# Patient Record
Sex: FEMALE | Race: OTHER | HISPANIC OR LATINO | URBAN - METROPOLITAN AREA
[De-identification: names, ages, dates, MRNs, and addresses within clinical notes are randomized per-mention and may not be internally consistent; named-entity substitution may affect disease eponyms.]

---

## 2018-03-22 ENCOUNTER — INPATIENT (INPATIENT)
Facility: HOSPITAL | Age: 68
LOS: 0 days | Discharge: ANOTHER HOSPITAL | DRG: 313 | End: 2018-03-23
Attending: INTERNAL MEDICINE | Admitting: INTERNAL MEDICINE
Payer: COMMERCIAL

## 2018-03-22 VITALS — HEIGHT: 63.78 IN | WEIGHT: 151.02 LBS

## 2018-03-22 DIAGNOSIS — K21.9 GASTRO-ESOPHAGEAL REFLUX DISEASE WITHOUT ESOPHAGITIS: ICD-10-CM

## 2018-03-22 DIAGNOSIS — Z98.890 OTHER SPECIFIED POSTPROCEDURAL STATES: Chronic | ICD-10-CM

## 2018-03-22 DIAGNOSIS — R07.9 CHEST PAIN, UNSPECIFIED: ICD-10-CM

## 2018-03-22 DIAGNOSIS — Z90.710 ACQUIRED ABSENCE OF BOTH CERVIX AND UTERUS: Chronic | ICD-10-CM

## 2018-03-22 DIAGNOSIS — Z29.9 ENCOUNTER FOR PROPHYLACTIC MEASURES, UNSPECIFIED: ICD-10-CM

## 2018-03-22 DIAGNOSIS — S05.90XA UNSPECIFIED INJURY OF UNSPECIFIED EYE AND ORBIT, INITIAL ENCOUNTER: Chronic | ICD-10-CM

## 2018-03-22 DIAGNOSIS — E78.5 HYPERLIPIDEMIA, UNSPECIFIED: ICD-10-CM

## 2018-03-22 LAB
ALBUMIN SERPL ELPH-MCNC: 3.9 G/DL — SIGNIFICANT CHANGE UP (ref 3.5–5)
ALP SERPL-CCNC: 97 U/L — SIGNIFICANT CHANGE UP (ref 40–120)
ALT FLD-CCNC: 23 U/L DA — SIGNIFICANT CHANGE UP (ref 10–60)
ANION GAP SERPL CALC-SCNC: 9 MMOL/L — SIGNIFICANT CHANGE UP (ref 5–17)
AST SERPL-CCNC: 20 U/L — SIGNIFICANT CHANGE UP (ref 10–40)
BASOPHILS # BLD AUTO: 0.1 K/UL — SIGNIFICANT CHANGE UP (ref 0–0.2)
BASOPHILS NFR BLD AUTO: 1.5 % — SIGNIFICANT CHANGE UP (ref 0–2)
BILIRUB SERPL-MCNC: 0.3 MG/DL — SIGNIFICANT CHANGE UP (ref 0.2–1.2)
BUN SERPL-MCNC: 22 MG/DL — HIGH (ref 7–18)
CALCIUM SERPL-MCNC: 9.1 MG/DL — SIGNIFICANT CHANGE UP (ref 8.4–10.5)
CHLORIDE SERPL-SCNC: 106 MMOL/L — SIGNIFICANT CHANGE UP (ref 96–108)
CHOLEST SERPL-MCNC: 255 MG/DL — HIGH (ref 10–199)
CK MB BLD-MCNC: <1.1 % — SIGNIFICANT CHANGE UP (ref 0–3.5)
CK MB CFR SERPL CALC: <1 NG/ML — SIGNIFICANT CHANGE UP (ref 0–3.6)
CK SERPL-CCNC: 91 U/L — SIGNIFICANT CHANGE UP (ref 21–215)
CO2 SERPL-SCNC: 26 MMOL/L — SIGNIFICANT CHANGE UP (ref 22–31)
CREAT SERPL-MCNC: 0.65 MG/DL — SIGNIFICANT CHANGE UP (ref 0.5–1.3)
EOSINOPHIL # BLD AUTO: 0.1 K/UL — SIGNIFICANT CHANGE UP (ref 0–0.5)
EOSINOPHIL NFR BLD AUTO: 2.4 % — SIGNIFICANT CHANGE UP (ref 0–6)
GLUCOSE SERPL-MCNC: 90 MG/DL — SIGNIFICANT CHANGE UP (ref 70–99)
HCT VFR BLD CALC: 40.2 % — SIGNIFICANT CHANGE UP (ref 34.5–45)
HDLC SERPL-MCNC: 59 MG/DL — SIGNIFICANT CHANGE UP (ref 40–125)
HGB BLD-MCNC: 13.2 G/DL — SIGNIFICANT CHANGE UP (ref 11.5–15.5)
LIPID PNL WITH DIRECT LDL SERPL: 167 MG/DL — SIGNIFICANT CHANGE UP
LYMPHOCYTES # BLD AUTO: 0.9 K/UL — LOW (ref 1–3.3)
LYMPHOCYTES # BLD AUTO: 22 % — SIGNIFICANT CHANGE UP (ref 13–44)
MCHC RBC-ENTMCNC: 29.6 PG — SIGNIFICANT CHANGE UP (ref 27–34)
MCHC RBC-ENTMCNC: 32.7 GM/DL — SIGNIFICANT CHANGE UP (ref 32–36)
MCV RBC AUTO: 90.6 FL — SIGNIFICANT CHANGE UP (ref 80–100)
MONOCYTES # BLD AUTO: 0.4 K/UL — SIGNIFICANT CHANGE UP (ref 0–0.9)
MONOCYTES NFR BLD AUTO: 9.3 % — SIGNIFICANT CHANGE UP (ref 2–14)
NEUTROPHILS # BLD AUTO: 2.8 K/UL — SIGNIFICANT CHANGE UP (ref 1.8–7.4)
NEUTROPHILS NFR BLD AUTO: 64.9 % — SIGNIFICANT CHANGE UP (ref 43–77)
NT-PROBNP SERPL-SCNC: 35 PG/ML — SIGNIFICANT CHANGE UP (ref 0–125)
PLATELET # BLD AUTO: 246 K/UL — SIGNIFICANT CHANGE UP (ref 150–400)
POTASSIUM SERPL-MCNC: 4.1 MMOL/L — SIGNIFICANT CHANGE UP (ref 3.5–5.3)
POTASSIUM SERPL-SCNC: 4.1 MMOL/L — SIGNIFICANT CHANGE UP (ref 3.5–5.3)
PROT SERPL-MCNC: 7.7 G/DL — SIGNIFICANT CHANGE UP (ref 6–8.3)
RAPID RVP RESULT: SIGNIFICANT CHANGE UP
RBC # BLD: 4.44 M/UL — SIGNIFICANT CHANGE UP (ref 3.8–5.2)
RBC # FLD: 14.1 % — SIGNIFICANT CHANGE UP (ref 10.3–14.5)
SODIUM SERPL-SCNC: 141 MMOL/L — SIGNIFICANT CHANGE UP (ref 135–145)
TOTAL CHOLESTEROL/HDL RATIO MEASUREMENT: 4.3 RATIO — SIGNIFICANT CHANGE UP (ref 3.3–7.1)
TRIGL SERPL-MCNC: 147 MG/DL — SIGNIFICANT CHANGE UP (ref 10–149)
TROPONIN I SERPL-MCNC: <0.015 NG/ML — SIGNIFICANT CHANGE UP (ref 0–0.04)
TSH SERPL-MCNC: 1.93 UU/ML — SIGNIFICANT CHANGE UP (ref 0.34–4.82)
WBC # BLD: 4.3 K/UL — SIGNIFICANT CHANGE UP (ref 3.8–10.5)
WBC # FLD AUTO: 4.3 K/UL — SIGNIFICANT CHANGE UP (ref 3.8–10.5)

## 2018-03-22 PROCEDURE — 99285 EMERGENCY DEPT VISIT HI MDM: CPT

## 2018-03-22 PROCEDURE — 71046 X-RAY EXAM CHEST 2 VIEWS: CPT | Mod: 26

## 2018-03-22 RX ORDER — ASPIRIN/CALCIUM CARB/MAGNESIUM 324 MG
81 TABLET ORAL DAILY
Qty: 0 | Refills: 0 | Status: DISCONTINUED | OUTPATIENT
Start: 2018-03-22 | End: 2018-03-23

## 2018-03-22 RX ORDER — ENOXAPARIN SODIUM 100 MG/ML
40 INJECTION SUBCUTANEOUS DAILY
Qty: 0 | Refills: 0 | Status: DISCONTINUED | OUTPATIENT
Start: 2018-03-22 | End: 2018-03-23

## 2018-03-22 RX ORDER — NITROGLYCERIN 6.5 MG
0.4 CAPSULE, EXTENDED RELEASE ORAL
Qty: 0 | Refills: 0 | Status: DISCONTINUED | OUTPATIENT
Start: 2018-03-22 | End: 2018-03-23

## 2018-03-22 RX ORDER — METOPROLOL TARTRATE 50 MG
12.5 TABLET ORAL
Qty: 0 | Refills: 0 | Status: DISCONTINUED | OUTPATIENT
Start: 2018-03-22 | End: 2018-03-23

## 2018-03-22 RX ORDER — ATORVASTATIN CALCIUM 80 MG/1
40 TABLET, FILM COATED ORAL AT BEDTIME
Qty: 0 | Refills: 0 | Status: DISCONTINUED | OUTPATIENT
Start: 2018-03-22 | End: 2018-03-23

## 2018-03-22 RX ORDER — SODIUM CHLORIDE 9 MG/ML
3 INJECTION INTRAMUSCULAR; INTRAVENOUS; SUBCUTANEOUS ONCE
Qty: 0 | Refills: 0 | Status: COMPLETED | OUTPATIENT
Start: 2018-03-22 | End: 2018-03-22

## 2018-03-22 RX ADMIN — ATORVASTATIN CALCIUM 40 MILLIGRAM(S): 80 TABLET, FILM COATED ORAL at 22:58

## 2018-03-22 RX ADMIN — SODIUM CHLORIDE 3 MILLILITER(S): 9 INJECTION INTRAMUSCULAR; INTRAVENOUS; SUBCUTANEOUS at 17:15

## 2018-03-22 NOTE — H&P ADULT - FAMILY HISTORY
Mother  Still living? No  Family history of MI (myocardial infarction), Age at diagnosis: Age Unknown     Father  Still living? Unknown  Family history of leukemia, Age at diagnosis: Age Unknown

## 2018-03-22 NOTE — H&P ADULT - RS GEN PE MLT RESP DETAILS PC
no rhonchi/no rales/no wheezes/good air movement/clear to auscultation bilaterally/chest wall tenderness/breath sounds equal/normal/airway patent/respirations non-labored

## 2018-03-22 NOTE — ED PROVIDER NOTE - OBJECTIVE STATEMENT
67 y/o F pt with PMHx of HLD and no significant PSHx presents to ED c/o intermittent CP with associated SOB x2 weeks. Pt additionally reports nausea and dizziness. Per pt, current sx's are non-reproducible. Pt states having been seen by her PMD and a cardiologist, and was ultimately sent to the ED for cardiac evaluation; pt received a full-dose Aspirin earlier today. Pt notes FHx of CAD, with pt's mother dying at 45 y/o from an MI. Pt also notes recent travel from [place] approximately x1 week ago. Pt denies b/l leg pain, b/l leg swelling, or any other complaints. NKDA.

## 2018-03-22 NOTE — ED ADULT NURSE NOTE - OBJECTIVE STATEMENT
Pt AOx3, ambulatory, c/o chest pain and SOB x 2 weeks. Pt also reports occasional n/v. Sent from cardiologist for abnormal EKG. Pt denies LOC, syncope. Pt traveled to [place] 1 week ago, denies legs pain.

## 2018-03-22 NOTE — H&P ADULT - ASSESSMENT
68 F, from home lives with granddaughter ambulates independently, w/ PMHx of HLD and GERD sent in by Cardiologist Dr Bobby Salinas c/o intermittent CP with associated SOB x2 weeks. 68 F, from home lives with granddaughter ambulates independently, w/ PMHx of HLD and GERD sent in by Cardiologist Dr Bobby Salinas c/o intermittent CP with associated SOB x2 weeks.    Pt admitted for r/o ACS.

## 2018-03-22 NOTE — H&P ADULT - PROBLEM SELECTOR PLAN 1
Cardiology Dr Fine  r/o ACS  EKG NSR VR 62 TWI V3,4,5  trop x1 neg  f/u trop 2,3  start ASA, lipitor, BB  f/u ECHO    f/u UA - r/o UTI, pt complaining of dysuria for few days  f/u RVP - coughing, sneezing, nausea, decreased PO appetite

## 2018-03-22 NOTE — H&P ADULT - PROBLEM SELECTOR PLAN 4
IMPROVE VTE Individual Risk Assessment    RISK                                                          Points  [] Previous VTE                                           3  [] Thrombophilia                                        2  [] Lower limb paralysis                              2   [] Current Cancer                                       2   [X] Immobilization > 24 hrs                        1  [] ICU/CCU stay > 24 hours                       1  [X] Age > 60                                                   1    IMPROVE VTE Score: 2  lovenox for DVT ppx  protonix for GI ppx

## 2018-03-22 NOTE — ED PROVIDER NOTE - MEDICAL DECISION MAKING DETAILS
65 y/o F pt with FHx of CAD and Hx of HLD presents with CP and SOB. Will do cardiac enzymes, CXR, and anticipate admission for cardiac evaluation.

## 2018-03-22 NOTE — H&P ADULT - HISTORY OF PRESENT ILLNESS
67 y/o F pt with PMHx of HLD and no significant PSHx presents to ED c/o intermittent CP with associated SOB x2 weeks. Pt additionally reports nausea and dizziness. Per pt, current sx's are non-reproducible. Pt states having been seen by her PMD and a cardiologist, and was ultimately sent to the ED for cardiac evaluation; pt received a full-dose Aspirin earlier today. Pt notes FHx of CAD, with pt's mother dying at 45 y/o from an MI. Pt also notes recent travel from [place] approximately x1 week ago. Pt denies b/l leg pain, b/l leg swelling, or any other complaints. NKDA. 68 F, from home lives with granddaughter ambulates independently, w/ PMHx of HLD and GERD sent in by Cardiologist Dr Bobby Salinas c/o intermittent CP with associated SOB x2 weeks. Pt endorsed 8/10 intermittent left sided dull pain with radiation to the back that self resolves in less than 7 minutes, comes on about 4x/day, improves while lying flat. Additionally, pt endorsed subjective fever, chills, decreased appetite, nausea, dizziness, coughing w/o sputum production and runny nose for a week. Pt recently travelled to [place] for 10 days and returned 2 days ago. Pt also endorsed dysuria for past few days. No notable sick contacts.     Family hx sig. for mother passed away from MI at 47 y/o.

## 2018-03-23 ENCOUNTER — TRANSCRIPTION ENCOUNTER (OUTPATIENT)
Age: 68
End: 2018-03-23

## 2018-03-23 ENCOUNTER — INPATIENT (INPATIENT)
Facility: HOSPITAL | Age: 68
LOS: 2 days | Discharge: HOME | DRG: 287 | End: 2018-03-26
Attending: INTERNAL MEDICINE | Admitting: INTERNAL MEDICINE
Payer: COMMERCIAL

## 2018-03-23 VITALS
SYSTOLIC BLOOD PRESSURE: 144 MMHG | DIASTOLIC BLOOD PRESSURE: 60 MMHG | RESPIRATION RATE: 18 BRPM | TEMPERATURE: 98 F | HEART RATE: 65 BPM | OXYGEN SATURATION: 100 %

## 2018-03-23 VITALS
DIASTOLIC BLOOD PRESSURE: 83 MMHG | WEIGHT: 151.02 LBS | TEMPERATURE: 98 F | RESPIRATION RATE: 18 BRPM | HEART RATE: 76 BPM | HEIGHT: 63 IN | SYSTOLIC BLOOD PRESSURE: 139 MMHG | OXYGEN SATURATION: 99 %

## 2018-03-23 DIAGNOSIS — Z98.890 OTHER SPECIFIED POSTPROCEDURAL STATES: Chronic | ICD-10-CM

## 2018-03-23 DIAGNOSIS — S05.90XA UNSPECIFIED INJURY OF UNSPECIFIED EYE AND ORBIT, INITIAL ENCOUNTER: Chronic | ICD-10-CM

## 2018-03-23 DIAGNOSIS — I20.0 UNSTABLE ANGINA: ICD-10-CM

## 2018-03-23 DIAGNOSIS — Z90.710 ACQUIRED ABSENCE OF BOTH CERVIX AND UTERUS: Chronic | ICD-10-CM

## 2018-03-23 LAB
24R-OH-CALCIDIOL SERPL-MCNC: 26.5 NG/ML — LOW (ref 30–80)
ANION GAP SERPL CALC-SCNC: 9 MMOL/L — SIGNIFICANT CHANGE UP (ref 5–17)
BUN SERPL-MCNC: 26 MG/DL — HIGH (ref 7–18)
CALCIUM SERPL-MCNC: 9.4 MG/DL — SIGNIFICANT CHANGE UP (ref 8.4–10.5)
CHLORIDE SERPL-SCNC: 105 MMOL/L — SIGNIFICANT CHANGE UP (ref 96–108)
CK MB BLD-MCNC: <1.7 % — SIGNIFICANT CHANGE UP (ref 0–3.5)
CK MB BLD-MCNC: <2 % — SIGNIFICANT CHANGE UP (ref 0–3.5)
CK MB CFR SERPL CALC: <1 NG/ML — SIGNIFICANT CHANGE UP (ref 0–3.6)
CK MB CFR SERPL CALC: <1 NG/ML — SIGNIFICANT CHANGE UP (ref 0–3.6)
CK SERPL-CCNC: 49 U/L — SIGNIFICANT CHANGE UP (ref 21–215)
CK SERPL-CCNC: 59 U/L — SIGNIFICANT CHANGE UP (ref 21–215)
CO2 SERPL-SCNC: 25 MMOL/L — SIGNIFICANT CHANGE UP (ref 22–31)
CREAT SERPL-MCNC: 0.74 MG/DL — SIGNIFICANT CHANGE UP (ref 0.5–1.3)
GLUCOSE SERPL-MCNC: 91 MG/DL — SIGNIFICANT CHANGE UP (ref 70–99)
HBA1C BLD-MCNC: 5.7 % — HIGH (ref 4–5.6)
HCT VFR BLD CALC: 38.5 % — SIGNIFICANT CHANGE UP (ref 34.5–45)
HGB BLD-MCNC: 12.4 G/DL — SIGNIFICANT CHANGE UP (ref 11.5–15.5)
MAGNESIUM SERPL-MCNC: 2.4 MG/DL — SIGNIFICANT CHANGE UP (ref 1.6–2.6)
MCHC RBC-ENTMCNC: 29.6 PG — SIGNIFICANT CHANGE UP (ref 27–34)
MCHC RBC-ENTMCNC: 32.3 GM/DL — SIGNIFICANT CHANGE UP (ref 32–36)
MCV RBC AUTO: 91.9 FL — SIGNIFICANT CHANGE UP (ref 80–100)
PHOSPHATE SERPL-MCNC: 4.4 MG/DL — SIGNIFICANT CHANGE UP (ref 2.5–4.5)
PLATELET # BLD AUTO: 227 K/UL — SIGNIFICANT CHANGE UP (ref 150–400)
POTASSIUM SERPL-MCNC: 4.6 MMOL/L — SIGNIFICANT CHANGE UP (ref 3.5–5.3)
POTASSIUM SERPL-SCNC: 4.6 MMOL/L — SIGNIFICANT CHANGE UP (ref 3.5–5.3)
RBC # BLD: 4.19 M/UL — SIGNIFICANT CHANGE UP (ref 3.8–5.2)
RBC # FLD: 14.2 % — SIGNIFICANT CHANGE UP (ref 10.3–14.5)
SODIUM SERPL-SCNC: 139 MMOL/L — SIGNIFICANT CHANGE UP (ref 135–145)
TROPONIN I SERPL-MCNC: <0.015 NG/ML — SIGNIFICANT CHANGE UP (ref 0–0.04)
TROPONIN I SERPL-MCNC: <0.015 NG/ML — SIGNIFICANT CHANGE UP (ref 0–0.04)
VIT B12 SERPL-MCNC: 607 PG/ML — SIGNIFICANT CHANGE UP (ref 232–1245)
WBC # BLD: 5 K/UL — SIGNIFICANT CHANGE UP (ref 3.8–10.5)
WBC # FLD AUTO: 5 K/UL — SIGNIFICANT CHANGE UP (ref 3.8–10.5)

## 2018-03-23 PROCEDURE — 82553 CREATINE MB FRACTION: CPT

## 2018-03-23 PROCEDURE — 80061 LIPID PANEL: CPT

## 2018-03-23 PROCEDURE — 83735 ASSAY OF MAGNESIUM: CPT

## 2018-03-23 PROCEDURE — 83036 HEMOGLOBIN GLYCOSYLATED A1C: CPT

## 2018-03-23 PROCEDURE — 87633 RESP VIRUS 12-25 TARGETS: CPT

## 2018-03-23 PROCEDURE — 99285 EMERGENCY DEPT VISIT HI MDM: CPT | Mod: 25

## 2018-03-23 PROCEDURE — 84443 ASSAY THYROID STIM HORMONE: CPT

## 2018-03-23 PROCEDURE — 93005 ELECTROCARDIOGRAM TRACING: CPT

## 2018-03-23 PROCEDURE — 85027 COMPLETE CBC AUTOMATED: CPT

## 2018-03-23 PROCEDURE — 82550 ASSAY OF CK (CPK): CPT

## 2018-03-23 PROCEDURE — 93010 ELECTROCARDIOGRAM REPORT: CPT

## 2018-03-23 PROCEDURE — 84100 ASSAY OF PHOSPHORUS: CPT

## 2018-03-23 PROCEDURE — 87486 CHLMYD PNEUM DNA AMP PROBE: CPT

## 2018-03-23 PROCEDURE — 87798 DETECT AGENT NOS DNA AMP: CPT

## 2018-03-23 PROCEDURE — 83880 ASSAY OF NATRIURETIC PEPTIDE: CPT

## 2018-03-23 PROCEDURE — 82306 VITAMIN D 25 HYDROXY: CPT

## 2018-03-23 PROCEDURE — 82607 VITAMIN B-12: CPT

## 2018-03-23 PROCEDURE — 80048 BASIC METABOLIC PNL TOTAL CA: CPT

## 2018-03-23 PROCEDURE — 84484 ASSAY OF TROPONIN QUANT: CPT

## 2018-03-23 PROCEDURE — 80053 COMPREHEN METABOLIC PANEL: CPT

## 2018-03-23 PROCEDURE — 87581 M.PNEUMON DNA AMP PROBE: CPT

## 2018-03-23 PROCEDURE — 71046 X-RAY EXAM CHEST 2 VIEWS: CPT

## 2018-03-23 RX ORDER — PANTOPRAZOLE SODIUM 20 MG/1
40 TABLET, DELAYED RELEASE ORAL
Qty: 0 | Refills: 0 | Status: DISCONTINUED | OUTPATIENT
Start: 2018-03-23 | End: 2018-03-26

## 2018-03-23 RX ORDER — IBUPROFEN 200 MG
1 TABLET ORAL
Qty: 0 | Refills: 0 | COMMUNITY

## 2018-03-23 RX ORDER — PANTOPRAZOLE SODIUM 20 MG/1
1 TABLET, DELAYED RELEASE ORAL
Qty: 0 | Refills: 0 | COMMUNITY
Start: 2018-03-23

## 2018-03-23 RX ORDER — METOPROLOL TARTRATE 50 MG
0.5 TABLET ORAL
Qty: 30 | Refills: 0 | OUTPATIENT
Start: 2018-03-23 | End: 2018-04-21

## 2018-03-23 RX ORDER — OMEPRAZOLE 10 MG/1
1 CAPSULE, DELAYED RELEASE ORAL
Qty: 0 | Refills: 0 | COMMUNITY

## 2018-03-23 RX ORDER — NITROGLYCERIN 6.5 MG
0 CAPSULE, EXTENDED RELEASE ORAL
Qty: 0 | Refills: 0 | COMMUNITY
Start: 2018-03-23

## 2018-03-23 RX ORDER — ASPIRIN/CALCIUM CARB/MAGNESIUM 324 MG
81 TABLET ORAL DAILY
Qty: 0 | Refills: 0 | Status: DISCONTINUED | OUTPATIENT
Start: 2018-03-23 | End: 2018-03-26

## 2018-03-23 RX ORDER — METOPROLOL TARTRATE 50 MG
0 TABLET ORAL
Qty: 0 | Refills: 0 | COMMUNITY
Start: 2018-03-23

## 2018-03-23 RX ORDER — HEPARIN SODIUM 5000 [USP'U]/ML
900 INJECTION INTRAVENOUS; SUBCUTANEOUS
Qty: 25000 | Refills: 0 | Status: DISCONTINUED | OUTPATIENT
Start: 2018-03-23 | End: 2018-03-23

## 2018-03-23 RX ORDER — PANTOPRAZOLE SODIUM 20 MG/1
40 TABLET, DELAYED RELEASE ORAL
Qty: 0 | Refills: 0 | Status: DISCONTINUED | OUTPATIENT
Start: 2018-03-23 | End: 2018-03-23

## 2018-03-23 RX ORDER — ATORVASTATIN CALCIUM 80 MG/1
40 TABLET, FILM COATED ORAL AT BEDTIME
Qty: 0 | Refills: 0 | Status: DISCONTINUED | OUTPATIENT
Start: 2018-03-23 | End: 2018-03-26

## 2018-03-23 RX ORDER — METOPROLOL TARTRATE 50 MG
12.5 TABLET ORAL
Qty: 0 | Refills: 0 | Status: DISCONTINUED | OUTPATIENT
Start: 2018-03-23 | End: 2018-03-24

## 2018-03-23 RX ORDER — METOPROLOL TARTRATE 50 MG
0.5 TABLET ORAL
Qty: 0 | Refills: 0 | COMMUNITY
Start: 2018-03-23

## 2018-03-23 RX ORDER — ATORVASTATIN CALCIUM 80 MG/1
1 TABLET, FILM COATED ORAL
Qty: 0 | Refills: 0 | COMMUNITY
Start: 2018-03-23

## 2018-03-23 RX ORDER — ASPIRIN/CALCIUM CARB/MAGNESIUM 324 MG
81 TABLET ORAL DAILY
Qty: 0 | Refills: 0 | Status: DISCONTINUED | OUTPATIENT
Start: 2018-03-23 | End: 2018-03-23

## 2018-03-23 RX ORDER — SIMVASTATIN 20 MG/1
1 TABLET, FILM COATED ORAL
Qty: 0 | Refills: 0 | COMMUNITY

## 2018-03-23 RX ORDER — ASPIRIN/CALCIUM CARB/MAGNESIUM 324 MG
1 TABLET ORAL
Qty: 0 | Refills: 0 | COMMUNITY
Start: 2018-03-23

## 2018-03-23 RX ORDER — HEPARIN SODIUM 5000 [USP'U]/ML
9 INJECTION INTRAVENOUS; SUBCUTANEOUS
Qty: 0 | Refills: 0 | COMMUNITY
Start: 2018-03-23

## 2018-03-23 RX ADMIN — Medication 12.5 MILLIGRAM(S): at 17:05

## 2018-03-23 RX ADMIN — Medication 81 MILLIGRAM(S): at 17:05

## 2018-03-23 RX ADMIN — Medication 0.4 MILLIGRAM(S): at 01:49

## 2018-03-23 RX ADMIN — HEPARIN SODIUM 900 UNIT(S)/HR: 5000 INJECTION INTRAVENOUS; SUBCUTANEOUS at 08:10

## 2018-03-23 RX ADMIN — ATORVASTATIN CALCIUM 40 MILLIGRAM(S): 80 TABLET, FILM COATED ORAL at 21:24

## 2018-03-23 RX ADMIN — PANTOPRAZOLE SODIUM 40 MILLIGRAM(S): 20 TABLET, DELAYED RELEASE ORAL at 17:05

## 2018-03-23 NOTE — CONSULT NOTE ADULT - ASSESSMENT
68 yr old  Female , from home lives with granddaughter ambulates independently, w/ PMHx of CAD-s/[p PTCA 2017, HLD and GERD sent in by Cardiologist for cp,sob,ekg changes, now NSVT.  1.Heparin drip.  2.Echocardiogram.  3.Cardiac cath thi s AM.  4.CAD-asa,b blocker,statin.  5.PPI.

## 2018-03-23 NOTE — PROGRESS NOTE ADULT - PROBLEM SELECTOR PLAN 5
IMPROVE VTE Individual Risk Assessment  RISK                                                          Points  [X] Immobilization > 24 hrs                        1  [X] Age > 60                                                   1  IMPROVE VTE Score: 2, Lovenox for DVT ppx

## 2018-03-23 NOTE — PROGRESS NOTE ADULT - PROBLEM SELECTOR PLAN 1
EKG NSR w/ TWI V3-5  - Trop x 2 negative; pending T3  - C/w ASA, Lipitor, BB  - Negative RVP; c/o coughing, sneezing, nausea, decreased PO appetite  ***Pending TTE  Cardiology Dr. Fine

## 2018-03-23 NOTE — DISCHARGE NOTE ADULT - MEDICATION SUMMARY - MEDICATIONS TO CHANGE
I will SWITCH the dose or number of times a day I take the medications listed below when I get home from the hospital:  None I will SWITCH the dose or number of times a day I take the medications listed below when I get home from the hospital:    Metoprolol Tartrate 25 mg oral tablet  -- 0.5 tab(s) by mouth 2 times a day  hosp

## 2018-03-23 NOTE — DISCHARGE NOTE ADULT - PATIENT PORTAL LINK FT
You can access the [facility] Patient Portal, offered by [facility], by registering with the following website: [email or web address]

## 2018-03-23 NOTE — DISCHARGE NOTE ADULT - PROVIDER TOKENS
TOKEN:'[id number]',FREE:[LAST:[Brad],FIRST:[Bobby],PHONE:[phone]:[(   )    -],ADDRESS:[[address] [place] [address]]] TOKEN:'[id number]',FREE:[LAST:[Ryrafael],FIRST:[Bobby],PHONE:[phone]:[(   )    -],ADDRESS:[[address] [place] [address]]],FREE:[LAST:[Yee],PHONE:[(   )    -],FAX:[(   )    -]]

## 2018-03-23 NOTE — PROGRESS NOTE ADULT - SUBJECTIVE AND OBJECTIVE BOX
68 F, from home lives with granddaughter ambulates independently, w/ PMHx of HLD and GERD sent in by Cardiologist Dr Bobby Salinas c/o intermittent CP with associated SOB x2 weeks. Pt endorsed 8/10 intermittent left sided dull pain with radiation to the back that self resolves in less than 7 minutes, comes on about 4x/day, improves while lying flat. Additionally, pt endorsed subjective fever, chills, decreased appetite, nausea, dizziness, coughing w/o sputum production and runny nose for a week. Pt recently travelled to [place] for 10 days and returned 2 days ago. Pt also endorsed dysuria for past few days. No notable sick contacts.     Family hx sig. for mother passed away from MI at 45 y/o.           Review of Systems:  · General Symptoms	fever; chills	  · Skin/Breast	negative	  · Ophthalmologic	negative	  · ENMT	negative	  · Respiratory and Thorax Symptoms	cough	  · Cardiovascular Symptoms	chest pain; dyspnea on exertion	  · Gastrointestinal	negative	  · General Genitourinary Symptoms	dysuria	  · Musculoskeletal	negative	  · Neurological	negative	  · Psychiatric	negative	  · Hematology/Lymphatics	negative	  · Endocrine	negative	  · Allergic/Immunologic	negative	      pt seen in tele [x  ], reg med floor [   ], bed [ x ], chair at bedside [   ], a+o x3 [x  ], lethargic [  ],  nad [x ]    heparin drip [ x ]      pt again had episode of cp overnight relieved by nitroglycerine and also had episodes of nsvt      Allergies    No Known Allergies        Vitals    T(F): 97.7 (03-23-18 @ 08:46), Max: 98.7 (03-22-18 @ 21:46)  HR: 65 (03-23-18 @ 08:46) (65 - 74)  BP: 144/60 (03-23-18 @ 08:46) (109/49 - 154/68)  RR: 18 (03-23-18 @ 08:46) (18 - 18)  SpO2: 100% (03-23-18 @ 08:46) (98% - 100%)  Wt(kg): --  CAPILLARY BLOOD GLUCOSE          Labs                          12.4   5.0   )-----------( 227      ( 23 Mar 2018 07:38 )             38.5       03-23    139  |  105  |  26<H>  ----------------------------<  91  4.6   |  25  |  0.74    Ca    9.4      23 Mar 2018 07:38  Phos  4.4     03-23  Mg     2.4     03-23    TPro  7.7  /  Alb  3.9  /  TBili  0.3  /  DBili  x   /  AST  20  /  ALT  23  /  AlkPhos  97  03-22      CARDIAC MARKERS ( 23 Mar 2018 07:38 )  <0.015 ng/mL / x     / 49 U/L / x     / <1.0 ng/mL  CARDIAC MARKERS ( 23 Mar 2018 01:55 )  <0.015 ng/mL / x     / 59 U/L / x     / <1.0 ng/mL  CARDIAC MARKERS ( 22 Mar 2018 16:58 )  <0.015 ng/mL / x     / 91 U/L / x     / <1.0 ng/mL            Radiology Results      Meds    MEDICATIONS  (STANDING):  aspirin  chewable 81 milliGRAM(s) Oral daily  atorvastatin 40 milliGRAM(s) Oral at bedtime  heparin  Infusion. 900 Unit(s)/Hr (9 mL/Hr) IV Continuous <Continuous>  metoprolol tartrate 12.5 milliGRAM(s) Oral two times a day  pantoprazole    Tablet 40 milliGRAM(s) Oral before breakfast      MEDICATIONS  (PRN):  nitroglycerin     SubLingual 0.4 milliGRAM(s) SubLingual every 5 minutes PRN Chest Pain      Physical Exam    Neuro :  no focal deficits  Respiratory: CTA B/L  CV: RRR, S1S2, no murmurs,   Abdominal: Soft, NT, ND +BS,  Extremities: No edema, + peripheral pulses    ASSESSMENT    Chest pain  r/o acs  nsvt  h/o GERD (gastroesophageal reflux disease)  HLD (hyperlipidemia)  S/P ASHLEY-BSO  Trauma to eye  H/O bilateral hip replacements  No significant past surgical history      PLAN    adm to tele,   acs protocol,   cont lopressor, aspirin, statin,  cont heparin drip as per hospital normogram  ce q8 x 3 neg  cardio cons noted  cont current meds  pt for transfer to [place] for cardiac cath
PGY 1 Note discussed with supervising resident and primary attending    Patient is a 68y old  Female who presents with a chief complaint of chest pain x 2 weeks (22 Mar 2018 18:13)      INTERVAL HPI/OVERNIGHT EVENTS: Patient seen and examined at bedside with no new complaints    MEDICATIONS  (STANDING):  aspirin  chewable 81 milliGRAM(s) Oral daily  atorvastatin 40 milliGRAM(s) Oral at bedtime  enoxaparin Injectable 40 milliGRAM(s) SubCutaneous daily  metoprolol tartrate 12.5 milliGRAM(s) Oral two times a day    MEDICATIONS  (PRN):  nitroglycerin     SubLingual 0.4 milliGRAM(s) SubLingual every 5 minutes PRN Chest Pain      __________________________________________________  REVIEW OF SYSTEMS:  RESPIRATORY: No cough; No shortness of breath  CARDIOVASCULAR: + chest pain, no palpitations  GASTROINTESTINAL: No pain. No nausea or vomiting; No diarrhea     Vital Signs Last 24 Hrs  T(C): 36.6 (23 Mar 2018 05:00), Max: 37.1 (22 Mar 2018 21:46)  T(F): 97.9 (23 Mar 2018 05:00), Max: 98.7 (22 Mar 2018 21:46)  HR: 65 (23 Mar 2018 05:00) (65 - 74)  BP: 109/49 (23 Mar 2018 05:00) (109/49 - 154/68)  BP(mean): --  RR: 18 (23 Mar 2018 05:00) (18 - 18)  SpO2: 99% (23 Mar 2018 05:00) (98% - 100%)    ________________________________________________  PHYSICAL EXAM:  GENERAL: NAD  HEENT: Normocephalic;  conjunctivae and sclerae clear; moist mucous membranes;   NECK : supple  CHEST/LUNG: Clear to auscultation bilaterally with good air entry   HEART: S1 S2  regular; no murmurs, gallops or rubs  ABDOMEN: Soft, Nontender, Nondistended; Bowel sounds present  EXTREMITIES: No cyanosis; no edema; no calf tenderness  NERVOUS SYSTEM:  Awake and alert; Oriented  to place, person and time ; no new deficits    _________________________________________________  LABS:                        13.2   4.3   )-----------( 246      ( 22 Mar 2018 16:58 )             40.2     03-22    141  |  106  |  22<H>  ----------------------------<  90  4.1   |  26  |  0.65    Ca    9.1      22 Mar 2018 16:58    TPro  7.7  /  Alb  3.9  /  TBili  0.3  /  DBili  x   /  AST  20  /  ALT  23  /  AlkPhos  97  03-22        CAPILLARY BLOOD GLUCOSE            RADIOLOGY & ADDITIONAL TESTS:    Imaging Personally Reviewed:  YES    Consultant(s) Notes Reviewed:   YES    Care Discussed with Consultants : YES    Plan of care was discussed with patient and /or primary care giver; all questions and concerns were addressed and care was aligned with patient's wishes.

## 2018-03-23 NOTE — DISCHARGE NOTE ADULT - HOSPITAL COURSE
68 F from home lives with granddaughter ambulates independently, w/ PMHx of HLD and GERD p/w intermittent CP with associated SOB x 2 weeks and FH of early MI -  admitted for r/o ACS. EKG NSR w/ biphasic T waves at V3-5. Trop x 2 negative; pending T3 at AM. Treated w/ ASA, Lipitor, BB. Negative RVP; c/o coughing, sneezing, nausea, decreased PO appetite. TTE was pending in discharge. Cardiology Dr. Fine consulted and recommended transfer for catheterization. Patient is medically stable for transfer to [place] - the risks/benefits/results have been explained to the patient.

## 2018-03-23 NOTE — DISCHARGE NOTE ADULT - MEDICATION SUMMARY - MEDICATIONS TO STOP TAKING
I will STOP taking the medications listed below when I get home from the hospital:    nitroglycerin  -- hosp    heparin 100 units/mL-D5% intravenous solution  -- 9 milliliter(s) intravenous   hosp    pantoprazole 40 mg oral delayed release tablet  -- 1 tab(s) by mouth once a day (before a meal)  hosp

## 2018-03-23 NOTE — DISCHARGE NOTE ADULT - CONDITION (STATED IN TERMS THAT PERMIT A SPECIFIC MEASURABLE COMPARISON WITH CONDITION ON ADMISSION):
vs stable, RFA stable ( w/o bleeding or hematoma)- waiting on EP consult before DC ( Dr Cornejo) Stable for d/c to home.

## 2018-03-23 NOTE — DISCHARGE NOTE ADULT - FINDINGS/TREATMENT
< from: Cardiac Cath Lab - Adult (03.23.18 @ 13:23) >    VENTRICLES: EF estimated was 60 %.  CORONARY VESSELS: The coronary circulation is right dominant.  LM:   --  LM: Normal.  LAD:   --  LAD: Normal.  CX:   --  Circumflex: Angiography showed minor luminal irregularities with  no flow limiting lesions.  RCA:   --  RCA: Normal.  COMPLICATIONS: There were no complications.  DIAGNOSTIC IMPRESSIONS: There is mild irregularity of the coronary anatomy.  Left ventricular function is normal.  DIAGNOSTIC RECOMMENDATIONS: Medical management is recommended.    < end of copied text >

## 2018-03-23 NOTE — CONSULT NOTE ADULT - SUBJECTIVE AND OBJECTIVE BOX
CHIEF COMPLAINT:Patient is a 68y old  Female who presents with a chief complaint of chest pain x 2 weeks.      HPI:  68 yr old  Female , from home lives with granddaughter ambulates independently, w/ PMHx of CAD-s/[p PTCA 2017, HLD and GERD sent in by Cardiologist Dr Bobby Salinas c/o intermittent CP with associated SOB x2 weeks. Pt endorsed 8/10 intermittent left sided dull pain with radiation to the back that self resolves in less than 7 minutes, comes on about 4x/day, improves while lying flat. Pt was sent to ER by cardiologist for EKG changes. Overnight still having chest pain which improved with SL NTG. She had NSVT on tele at 5:55 am.          PAST MEDICAL & SURGICAL HISTORY:  CAD-s/p PTCA 2017  GERD (gastroesophageal reflux disease)  HLD (hyperlipidemia)  S/P ASHLEY-BSO  Trauma to eye  H/O bilateral hip replacements      MEDICATIONS  (STANDING):  aspirin  chewable 81 milliGRAM(s) Oral daily  atorvastatin 40 milliGRAM(s) Oral at bedtime  enoxaparin Injectable 40 milliGRAM(s) SubCutaneous daily  metoprolol tartrate 12.5 milliGRAM(s) Oral two times a day    MEDICATIONS  (PRN):  nitroglycerin     SubLingual 0.4 milliGRAM(s) SubLingual every 5 minutes PRN Chest Pain      FAMILY HISTORY:  Family history of leukemia (Father)  Family history of MI (myocardial infarction) (Mother), mother passed away from MI at 47 y/o.       SOCIAL HISTORY:    [x ] Non-smoker    [x ] Alcohol-denies    Allergies    No Known Allergies    Intolerances    	    REVIEW OF SYSTEMS:  CONSTITUTIONAL: No fever, weight loss, or fatigue  EYES: No eye pain, visual disturbances, or discharge  ENT:  No difficulty hearing, tinnitus, vertigo; No sinus or throat pain  NECK: No pain or stiffness  RESPIRATORY: No cough, wheezing, chills or hemoptysis; + Shortness of Breath  CARDIOVASCULAR: + chest pain, No palpitations, passing out, dizziness, or leg swelling  GASTROINTESTINAL: No abdominal or epigastric pain. No nausea, vomiting, or hematemesis; No diarrhea or constipation. No melena or hematochezia.  GENITOURINARY: No dysuria, frequency, hematuria, or incontinence  NEUROLOGICAL: No headaches, memory loss, loss of strength, numbness, or tremors  SKIN: No itching, burning, rashes, or lesions   LYMPH Nodes: No enlarged glands  ENDOCRINE: No heat or cold intolerance; No hair loss  MUSCULOSKELETAL: No joint pain or swelling; No muscle, back, or extremity pain  PSYCHIATRIC: No depression, anxiety, mood swings, or difficulty sleeping  HEME/LYMPH: No easy bruising, or bleeding gums  ALLERGY AND IMMUNOLOGIC: No hives or eczema	      PHYSICAL EXAM:  T(C): 36.6 (03-23-18 @ 05:00), Max: 37.1 (03-22-18 @ 21:46)  HR: 65 (03-23-18 @ 05:00) (65 - 74)  BP: 109/49 (03-23-18 @ 05:00) (109/49 - 154/68)  RR: 18 (03-23-18 @ 05:00) (18 - 18)  SpO2: 99% (03-23-18 @ 05:00) (98% - 100%)      Appearance: Normal	  HEENT:   Normal oral mucosa, PERRL, EOMI	  Lymphatic: No lymphadenopathy  Cardiovascular: Normal S1 S2, No JVD, No murmurs, No edema  Respiratory: Lungs clear to auscultation	  Psychiatry: A & O x 3, Mood & affect appropriate  Gastrointestinal:  Soft, Non-tender, + BS	  Skin: No rashes, No ecchymoses, No cyanosis	  Neurologic: Non-focal  Extremities: Normal range of motion, No clubbing, cyanosis or edema  Vascular: Peripheral pulses palpable 2+ bilaterally    	    ECG:  	nsr with t wave inversion anterior leads    LABS:	 	    CARDIAC MARKERS:  CARDIAC MARKERS ( 23 Mar 2018 01:55 )  <0.015 ng/mL / x     / 59 U/L / x     / <1.0 ng/mL  CARDIAC MARKERS ( 22 Mar 2018 16:58 )  <0.015 ng/mL / x     / 91 U/L / x     / <1.0 ng/mL                              13.2   4.3   )-----------( 246      ( 22 Mar 2018 16:58 )             40.2     03-22    141  |  106  |  22<H>  ----------------------------<  90  4.1   |  26  |  0.65    Ca    9.1      22 Mar 2018 16:58    TPro  7.7  /  Alb  3.9  /  TBili  0.3  /  DBili  x   /  AST  20  /  ALT  23  /  AlkPhos  97  03-22    proBNP: Serum Pro-Brain Natriuretic Peptide: 35 pg/mL (03-22 @ 16:58)    Lipid Profile: Cholesterol 255    HDL 59        TSH: Thyroid Stimulating Hormone, Serum: 1.93 uU/mL (03-22 @ 16:58)      RVP-.

## 2018-03-23 NOTE — DISCHARGE NOTE ADULT - PATIENT PORTAL LINK FT
You can access the [facility] Patient Portal, offered by [facility], by registering with the following website: [email or web address]

## 2018-03-23 NOTE — DISCHARGE NOTE ADULT - MEDICATION SUMMARY - MEDICATIONS TO TAKE
I will START or STAY ON the medications listed below when I get home from the hospital:    aspirin 81 mg oral tablet, chewable  -- 1 tab(s) by mouth once a day  -- Indication: For ACS    nitroglycerin  -- Indication: For ACS    heparin 100 units/mL-D5% intravenous solution  -- 9 milliliter(s) intravenous   -- Indication: For ACS    atorvastatin 40 mg oral tablet  -- 1 tab(s) by mouth once a day (at bedtime)  -- Indication: For ACS    metoprolol  -- Indication: For ACS    pantoprazole 40 mg oral delayed release tablet  -- 1 tab(s) by mouth once a day (before a meal)  -- Indication: For GERD (gastroesophageal reflux disease)

## 2018-03-23 NOTE — CHART NOTE - NSCHARTNOTEFT_GEN_A_CORE
paged regarding patient experience chest pain at approx 2 AM  pt states pain ws a 6/10 pressure like chest pain, nonradiating, lasting 15 minutes, relieved with one dose of nitroglycerin  pt also notes a mild headache, however no other symptoms  vitals stable    Vital Signs Last 24 Hrs  T(C): 36.6 (23 Mar 2018 01:45), Max: 37.1 (22 Mar 2018 21:46)  T(F): 97.8 (23 Mar 2018 01:45), Max: 98.7 (22 Mar 2018 21:46)  HR: 69 (23 Mar 2018 01:45) (66 - 74)  BP: 118/61 (23 Mar 2018 01:45) (118/61 - 154/68)  RR: 18 (23 Mar 2018 01:45) (18 - 18)  SpO2: 98% (23 Mar 2018 01:45) (98% - 100%)    cardiac enzymes drawn prior to patient experiencing chest pain, will follow-up T2 paged regarding patient experience chest pain at approx 2 AM  pt states pain ws a 6/10 pressure like chest pain, nonradiating, lasting 15 minutes, relieved with one dose of nitroglycerin  pt also notes a mild headache, however no other symptoms  vitals stable    Vital Signs Last 24 Hrs  T(C): 36.6 (23 Mar 2018 01:45), Max: 37.1 (22 Mar 2018 21:46)  T(F): 97.8 (23 Mar 2018 01:45), Max: 98.7 (22 Mar 2018 21:46)  HR: 69 (23 Mar 2018 01:45) (66 - 74)  BP: 118/61 (23 Mar 2018 01:45) (118/61 - 154/68)  RR: 18 (23 Mar 2018 01:45) (18 - 18)  SpO2: 98% (23 Mar 2018 01:45) (98% - 100%)    cardiac enzymes drawn prior to patient experiencing chest pain, cardiac enzymes negative x2  primary team to follow-up T3 in AM paged regarding patient experience chest pain at approx 2 AM  pt states pain ws a 6/10 pressure like chest pain, nonradiating, lasting 15 minutes, relieved with one dose of nitroglycerin  pt also notes a mild headache, however no other symptoms  vitals stable  EKG no significant ST-T-wave changes compared to prior EKG    Vital Signs Last 24 Hrs  T(C): 36.6 (23 Mar 2018 01:45), Max: 37.1 (22 Mar 2018 21:46)  T(F): 97.8 (23 Mar 2018 01:45), Max: 98.7 (22 Mar 2018 21:46)  HR: 69 (23 Mar 2018 01:45) (66 - 74)  BP: 118/61 (23 Mar 2018 01:45) (118/61 - 154/68)  RR: 18 (23 Mar 2018 01:45) (18 - 18)  SpO2: 98% (23 Mar 2018 01:45) (98% - 100%)    cardiac enzymes drawn while patient was experiencing chest pain, cardiac enzymes negative x2  primary team to follow-up T3 in AM

## 2018-03-23 NOTE — DISCHARGE NOTE ADULT - PLAN OF CARE
Pt remains chest pain free and understands post cath discharge instructions Low salt, low fat diet.   Weight management.   Take medications as prescribed.    No smoking.  Follow up appointments with your doctor(s)  as instruced.  No heavy lifting for 2 weeks, no strenuous activity  or uneccesary stair climbing, no driving for x 2 days,  you may shower 24 hours following procedure but no bathing or swimming for x1  week, no bending, no straining while having a Bowel movement, No strenuous sexual activity for x 1 week check groin for bleeding, pain, tightness or ( golf ball size)  swelling daily following procedure , & follow up with your cardiologist in 1-2 week Your LDL cholesterol will be less than 70mg/dL Continue with your cholesterol medications. Eat a heart healthy diet that is low in saturated fats and salt, and includes whole grains, fruits, vegetables and lean protein; exercise regularly (consult with your physician or cardiologist first); maintain a heart healthy weight; if you smoke - quit (A resource to help you stop smoking is the [facility] – phone number 218-527-9272.). Continue to follow with your primary physician or cardiologist. Will remain free of acid reflux episodes Continue current antiacid medication. Avoid foods that potentially exacerbate GERD s&s ,such as: spicy foods, citrus fruits Your blood pressure will be controlled. Continue with your blood pressure medications; eat a heart healthy diet with low salt diet; exercise regularly (consult with your physician or cardiologist first); maintain a heart healthy weight; if you smoke - quit (A resource to help you stop smoking is the [facility] – phone number 722-748-2635.); include healthy ways to manage stress. Continue to follow with your primary care physician or cardiologist. 1.4 cm right suprarenal hyperdense lesion and periportal hypodense lymph nodes seen on CTA.  Please follow up for further evaluation and management with your doctor in 1 week.

## 2018-03-23 NOTE — DISCHARGE NOTE ADULT - MEDICATION SUMMARY - MEDICATIONS TO TAKE
I will START or STAY ON the medications listed below when I get home from the hospital:    aspirin 81 mg oral tablet, chewable  -- 1 tab(s) by mouth once a day  home  -- Indication: For Chest pain    ibuprofen 600 mg oral tablet  -- 1 tab(s) by mouth every 6 hours, As Needed  home  -- Indication: For pain reliever     atorvastatin 40 mg oral tablet  -- 1 tab(s) by mouth once a day (at bedtime)  home & hosp  -- Indication: For High cholesterol    Metoprolol Tartrate 25 mg oral tablet  -- 0.5 tab(s) by mouth 2 times a day  hosp MDD:1 tab/ day  -- Indication: For High blood pressure    omeprazole 40 mg oral delayed release capsule  -- 1 cap(s) by mouth once a day  home  -- Indication: For acid reflux I will START or STAY ON the medications listed below when I get home from the hospital:    aspirin 81 mg oral tablet, chewable  -- 1 tab(s) by mouth once a day  home  -- Indication: For Chest pain    ibuprofen 600 mg oral tablet  -- 1 tab(s) by mouth every 6 hours, As Needed  home  -- Indication: For pain reliever     atorvastatin 40 mg oral tablet  -- 1 tab(s) by mouth once a day (at bedtime)  home & hosp  -- Indication: For High cholesterol    metoprolol succinate 50 mg oral tablet, extended release  -- 1 tab(s) by mouth once a day  -- Indication: For HTN (hypertension)    omeprazole 40 mg oral delayed release capsule  -- 1 cap(s) by mouth once a day  home  -- Indication: For acid reflux

## 2018-03-23 NOTE — DISCHARGE NOTE ADULT - PLAN OF CARE
Transfer for catheterization for evaluation of ischemic heart disease Due to your persistent chest pain relieved with nitroglycerin, EKG concerning for biphasic T waves, and associated telemetry findings (nonsustained ventricular tachycardia), the cardiologist has recommended transfer for continued cardiac care.

## 2018-03-23 NOTE — DISCHARGE NOTE ADULT - CARE PROVIDER_API CALL
Marino Shelley (DO), Cardiology; Interventional Cardiology  [address] [place] [address]  Phone: [phone]  Fax: [phone]    Bobby Salinas [address] [address]  Phone: [phone]  Fax: (   )    - Marino Shelley (DO), Cardiology; Interventional Cardiology  [address] [place] [address]  Phone: [phone]  Fax: [phone]    Bboby Salinas [address] [address]  Phone: [phone]  Fax: (   )    -    Joselito,   Phone: (   )    -  Fax: (   )    -

## 2018-03-23 NOTE — DISCHARGE NOTE ADULT - CARE PLAN
Principal Discharge DX:	Chest pain, rule out acute myocardial infarction  Goal:	Transfer for catheterization for evaluation of ischemic heart disease  Assessment and plan of treatment:	Due to your persistent chest pain relieved with nitroglycerin, EKG concerning for biphasic T waves, and associated telemetry findings (nonsustained ventricular tachycardia), the cardiologist has recommended transfer for continued cardiac care.

## 2018-03-23 NOTE — DISCHARGE NOTE ADULT - HOSPITAL COURSE
This is a 67 yo  female, only speaks Lao,  # [id number], w/ PMHx of HLD and GERD, CAD s/[p PTCA 2017 at [facility]; significant FH for premature CAD- mother passed away from MI at 45 y/o.. On 3/22 went to [facility] Ed as adviced by her cardiologist Dr Bobby Salinas with c/c of  intermittent CP with associated SOB x2 weeks, and "EKG changes'. In ED patient  endorsed 8/10 intermittent left sided dull pain with radiation to the back that self resolves in less than 7 minutes, comes on about 4x/day, improves while lying flat. Additionally, pt endorsed subjective fever, chills, decreased appetite, nausea, dizziness, coughing w/o sputum production and runny nose for a week. Overnight was still having chest pain which improved with SL NTG. She had NSVT on tele at 5:55 am. CE neg X3, Was started on ACS Heparin gtt, started on ASA and BB;  transferred to [facility] for cardiac cath w/ possible intervention.   Of note: Pt recently travelled to [place] for 10 days and returned 2 days ago. Pt also endorsed dysuria for past few days. No notable sick contacts.   -on arrival to [facility] pt verbal minl shortness of breath at rest, denies any chest pain, dizziness, palpitations, orthopnea, N&V, Ha. abd pain. Awaiting on cath. This is a 69 yo  female, only speaks Marshallese,  # [id number], w/ PMHx of HLD and GERD, CAD s/[p PTCA 2017 at [facility]; significant FH for premature CAD- mother passed away from MI at 45 y/o.. On 3/22 went to [facility] Ed as adviced by her cardiologist Dr Bobby Salinas with c/c of  intermittent CP with associated SOB x2 weeks, and "EKG changes'. In ED patient  endorsed 8/10 intermittent left sided dull pain with radiation to the back that self resolves in less than 7 minutes, comes on about 4x/day, improves while lying flat. Additionally, pt endorsed subjective fever, chills, decreased appetite, nausea, dizziness, coughing w/o sputum production and runny nose for a week. Overnight was still having chest pain which improved with SL NTG. She had NSVT on tele at 5:55 am. CE neg X3, Was started on ACS Heparin gtt, started on ASA and BB;  transferred to [facility] for cardiac cath w/ possible intervention.   Of note: Pt recently travelled to [place] for 10 days and returned 2 days ago. Pt also endorsed dysuria for past few days. No notable sick contacts. -on arrival to [facility] pt verbal minl shortness of breath at rest, denies any chest pain, dizziness, palpitations, orthopnea, N&V, Ha. abd pain. Awaiting on cath. Pt is now s/p diagnostic cardiac cath showed luminal irregularities. Now awaiting EP evaluation for NSVT. This is a 69 yo  female, only speaks Malian,  # [id number], w/ PMHx of HLD and GERD, CAD s/[p PTCA 2017 at [facility] [place] [facility]; significant FH for premature CAD- mother passed away from MI at 45 y/o.. [facility] by her cardiologist Dr Bobby Salinas with c/c of  intermittent CP with associated SOB x2 weeks, and "EKG changes'. In ED patient  endorsed 8/10 intermittent left sided dull pain with radiation to the back that self resolves in less than 7 minutes, comes on about 4x/day, improves while lying flat. Additionally, pt endorsed subjective fever, chills, decreased appetite, nausea, dizziness, coughing w/o sputum production and runny nose for a week. Overnight was still having chest pain which improved with SL NTG. She had NSVT on tele at 5:55 am. CE neg X3, Was started on ACS Heparin gtt, started on ASA and BB;  transferred to [facility] for cardiac cath w/ possible intervention.   Of note: Pt recently travelled to [place] for 10 days and returned 2 days ago. Pt also endorsed dysuria for past few days. No notable sick contacts. -on arrival to [facility] pt verbal minl shortness of breath at rest, denies any chest pain, dizziness, palpitations, orthopnea, N&V, Ha. abd pain. Awaiting on cath. Pt is now s/p diagnostic cardiac cath showed luminal irregularities. S/P EP evaluation for NSVT.   No further SVT.  CTA negative for PE found to have 1.4 cm right suprarenal hyperdense lesion and periportal hypodense lymph nodes.  Pt wishes to follow up with her PMD as out pt for renal nodule work up.

## 2018-03-23 NOTE — H&P CARDIOLOGY - HISTORY OF PRESENT ILLNESS
This is a 67 yo  female, only speaks Romanian,  # [id number], w/ PMHx of HLD and GERD, CAD s/[p PTCA 2017 at [facility]; significant FH for premature CAD- mother passed away from MI at 45 y/o.. On 2/22 went to [facility] Ed as adviced by her cardiologist Dr Bobby Salinas with c/c of  intermittent CP with associated SOB x2 weeks, and EKG changes. In ED patient  endorsed 8/10 intermittent left sided dull pain with radiation to the back that self resolves in less than 7 minutes, comes on about 4x/day, improves while lying flat. Additionally, pt endorsed subjective fever, chills, decreased appetite, nausea, dizziness, coughing w/o sputum production and runny nose for a week. Overnight was still having chest pain which improved with SL NTG. She had NSVT on tele at 5:55 am. CE neg X3, Was started on ACS Heparin gtt, started on ASA and BB;  transferred to [facility] for cardiac cath w/ possible intervention.   Of note: Pt recently travelled to [place] for 10 days and returned 2 days ago. Pt also endorsed dysuria for past few days. No notable sick contacts.   -on arrival to [facility] pt verbal minl shortness of breath at rest, denies any chest pain, dizziness, palpitations, orthopnea, N&V, Ha. abd pain. Awaiting on cath. This is a 69 yo  female, only speaks Bruneian,  # [id number], w/ PMHx of HLD and GERD, CAD s/[p PTCA 2017 at [facility]; significant FH for premature CAD- mother passed away from MI at 47 y/o.. On 3/22 went to [facility] Ed as adviced by her cardiologist Dr Bobby Salinas with c/c of  intermittent CP with associated SOB x2 weeks, and "EKG changes'. In ED patient  endorsed 8/10 intermittent left sided dull pain with radiation to the back that self resolves in less than 7 minutes, comes on about 4x/day, improves while lying flat. Additionally, pt endorsed subjective fever, chills, decreased appetite, nausea, dizziness, coughing w/o sputum production and runny nose for a week. Overnight was still having chest pain which improved with SL NTG. She had NSVT on tele at 5:55 am. CE neg X3, Was started on ACS Heparin gtt, started on ASA and BB;  transferred to [facility] for cardiac cath w/ possible intervention.   Of note: Pt recently travelled to [place] for 10 days and returned 2 days ago. Pt also endorsed dysuria for past few days. No notable sick contacts.   -on arrival to [facility] pt verbal minl shortness of breath at rest, denies any chest pain, dizziness, palpitations, orthopnea, N&V, Ha. abd pain. Awaiting on cath.

## 2018-03-23 NOTE — H&P CARDIOLOGY - PMH
CAD (coronary artery disease)    GERD (gastroesophageal reflux disease)    History of percutaneous coronary intervention    HLD (hyperlipidemia)

## 2018-03-23 NOTE — H&P CARDIOLOGY - PSH
H/O bilateral hip replacements    H/O cardiac catheterization    H/O knee surgery    S/P ASHLEY-BSO    Trauma to eye

## 2018-03-23 NOTE — PROGRESS NOTE ADULT - ASSESSMENT
68 F from home lives with granddaughter ambulates independently, w/ PMHx of HLD and GERD p/w intermittent CP with associated SOB x 2 weeks and FH of early MI -  admitted for r/o ACS.

## 2018-03-23 NOTE — DISCHARGE NOTE ADULT - CARE PLAN
Principal Discharge DX:	CAD (coronary artery disease)  Goal:	Pt remains chest pain free and understands post cath discharge instructions  Assessment and plan of treatment:	Low salt, low fat diet.   Weight management.   Take medications as prescribed.    No smoking.  Follow up appointments with your doctor(s)  as instruced.  No heavy lifting for 2 weeks, no strenuous activity  or uneccesary stair climbing, no driving for x 2 days,  you may shower 24 hours following procedure but no bathing or swimming for x1  week, no bending, no straining while having a Bowel movement, No strenuous sexual activity for x 1 week check groin for bleeding, pain, tightness or ( golf ball size)  swelling daily following procedure , & follow up with your cardiologist in 1-2 week  Secondary Diagnosis:	HLD (hyperlipidemia)  Goal:	Your LDL cholesterol will be less than 70mg/dL  Assessment and plan of treatment:	Continue with your cholesterol medications. Eat a heart healthy diet that is low in saturated fats and salt, and includes whole grains, fruits, vegetables and lean protein; exercise regularly (consult with your physician or cardiologist first); maintain a heart healthy weight; if you smoke - quit (A resource to help you stop smoking is the [facility] [employer] Tobacco Control – phone number [phone].). Continue to follow with your primary physician or cardiologist.  Secondary Diagnosis:	GERD (gastroesophageal reflux disease)  Goal:	Will remain free of acid reflux episodes  Assessment and plan of treatment:	Continue current antiacid medication. Avoid foods that potentially exacerbate GERD s&s ,such as: spicy foods, citrus fruits  Secondary Diagnosis:	HTN (hypertension)  Goal:	Your blood pressure will be controlled.  Assessment and plan of treatment:	Continue with your blood pressure medications; eat a heart healthy diet with low salt diet; exercise regularly (consult with your physician or cardiologist first); maintain a heart healthy weight; if you smoke - quit (A resource to help you stop smoking is the [facility] [employer] – phone number [phone].); include healthy ways to manage stress. Continue to follow with your primary care physician or cardiologist. Principal Discharge DX:	CAD (coronary artery disease)  Goal:	Pt remains chest pain free and understands post cath discharge instructions  Assessment and plan of treatment:	Low salt, low fat diet.   Weight management.   Take medications as prescribed.    No smoking.  Follow up appointments with your doctor(s)  as instruced.  No heavy lifting for 2 weeks, no strenuous activity  or uneccesary stair climbing, no driving for x 2 days,  you may shower 24 hours following procedure but no bathing or swimming for x1  week, no bending, no straining while having a Bowel movement, No strenuous sexual activity for x 1 week check groin for bleeding, pain, tightness or ( golf ball size)  swelling daily following procedure , & follow up with your cardiologist in 1-2 week  Secondary Diagnosis:	HLD (hyperlipidemia)  Goal:	Your LDL cholesterol will be less than 70mg/dL  Assessment and plan of treatment:	Continue with your cholesterol medications. Eat a heart healthy diet that is low in saturated fats and salt, and includes whole grains, fruits, vegetables and lean protein; exercise regularly (consult with your physician or cardiologist first); maintain a heart healthy weight; if you smoke - quit (A resource to help you stop smoking is the [facility] [employer] – phone number [phone].). Continue to follow with your primary physician or cardiologist.  Secondary Diagnosis:	GERD (gastroesophageal reflux disease)  Goal:	Will remain free of acid reflux episodes  Assessment and plan of treatment:	Continue current antiacid medication. Avoid foods that potentially exacerbate GERD s&s ,such as: spicy foods, citrus fruits  Secondary Diagnosis:	HTN (hypertension)  Goal:	Your blood pressure will be controlled.  Assessment and plan of treatment:	Continue with your blood pressure medications; eat a heart healthy diet with low salt diet; exercise regularly (consult with your physician or cardiologist first); maintain a heart healthy weight; if you smoke - quit (A resource to help you stop smoking is the [facility] [employer] – phone number [phone].); include healthy ways to manage stress. Continue to follow with your primary care physician or cardiologist.  Secondary Diagnosis:	Renal mass, right  Assessment and plan of treatment:	1.4 cm right suprarenal hyperdense lesion and periportal hypodense lymph nodes seen on CTA.  Please follow up for further evaluation and management with your doctor in 1 week.

## 2018-03-24 LAB
ANION GAP SERPL CALC-SCNC: 17 MMOL/L — SIGNIFICANT CHANGE UP (ref 5–17)
BUN SERPL-MCNC: 27 MG/DL — HIGH (ref 7–23)
CALCIUM SERPL-MCNC: 9.4 MG/DL — SIGNIFICANT CHANGE UP (ref 8.4–10.5)
CHLORIDE SERPL-SCNC: 101 MMOL/L — SIGNIFICANT CHANGE UP (ref 96–108)
CO2 SERPL-SCNC: 22 MMOL/L — SIGNIFICANT CHANGE UP (ref 22–31)
CREAT SERPL-MCNC: 0.89 MG/DL — SIGNIFICANT CHANGE UP (ref 0.5–1.3)
CRP SERPL-MCNC: <0.2 MG/DL — SIGNIFICANT CHANGE UP (ref 0–0.4)
D DIMER BLD IA.RAPID-MCNC: 456 NG/ML DDU — HIGH
ERYTHROCYTE [SEDIMENTATION RATE] IN BLOOD: 7 MM/HR — SIGNIFICANT CHANGE UP (ref 0–20)
GLUCOSE SERPL-MCNC: 116 MG/DL — HIGH (ref 70–99)
HCT VFR BLD CALC: 39.8 % — SIGNIFICANT CHANGE UP (ref 34.5–45)
HGB BLD-MCNC: 13.3 G/DL — SIGNIFICANT CHANGE UP (ref 11.5–15.5)
MCHC RBC-ENTMCNC: 29.8 PG — SIGNIFICANT CHANGE UP (ref 27–34)
MCHC RBC-ENTMCNC: 33.5 GM/DL — SIGNIFICANT CHANGE UP (ref 32–36)
MCV RBC AUTO: 89 FL — SIGNIFICANT CHANGE UP (ref 80–100)
PLATELET # BLD AUTO: 249 K/UL — SIGNIFICANT CHANGE UP (ref 150–400)
POTASSIUM SERPL-MCNC: 4.1 MMOL/L — SIGNIFICANT CHANGE UP (ref 3.5–5.3)
POTASSIUM SERPL-SCNC: 4.1 MMOL/L — SIGNIFICANT CHANGE UP (ref 3.5–5.3)
RBC # BLD: 4.47 M/UL — SIGNIFICANT CHANGE UP (ref 3.8–5.2)
RBC # FLD: 13.3 % — SIGNIFICANT CHANGE UP (ref 10.3–14.5)
SODIUM SERPL-SCNC: 140 MMOL/L — SIGNIFICANT CHANGE UP (ref 135–145)
WBC # BLD: 4.7 K/UL — SIGNIFICANT CHANGE UP (ref 3.8–10.5)
WBC # FLD AUTO: 4.7 K/UL — SIGNIFICANT CHANGE UP (ref 3.8–10.5)

## 2018-03-24 PROCEDURE — 93306 TTE W/DOPPLER COMPLETE: CPT | Mod: 26

## 2018-03-24 RX ORDER — METOPROLOL TARTRATE 50 MG
50 TABLET ORAL DAILY
Qty: 0 | Refills: 0 | Status: DISCONTINUED | OUTPATIENT
Start: 2018-03-24 | End: 2018-03-26

## 2018-03-24 RX ORDER — METOPROLOL TARTRATE 50 MG
37.5 TABLET ORAL ONCE
Qty: 0 | Refills: 0 | Status: COMPLETED | OUTPATIENT
Start: 2018-03-24 | End: 2018-03-24

## 2018-03-24 RX ADMIN — Medication 12.5 MILLIGRAM(S): at 05:46

## 2018-03-24 RX ADMIN — ATORVASTATIN CALCIUM 40 MILLIGRAM(S): 80 TABLET, FILM COATED ORAL at 22:16

## 2018-03-24 RX ADMIN — Medication 37.5 MILLIGRAM(S): at 12:52

## 2018-03-24 RX ADMIN — PANTOPRAZOLE SODIUM 40 MILLIGRAM(S): 20 TABLET, DELAYED RELEASE ORAL at 05:46

## 2018-03-24 RX ADMIN — Medication 81 MILLIGRAM(S): at 05:46

## 2018-03-24 NOTE — PROGRESS NOTE ADULT - SUBJECTIVE AND OBJECTIVE BOX
Patient is a 68y old  Female who presents with a chief complaint of chest pain and shortness of breath (23 Mar 2018 15:06) now s/p diagnostic cath       Allergies    No Known Allergies    Intolerances        Medications:  aspirin enteric coated 81 milliGRAM(s) Oral daily  atorvastatin 40 milliGRAM(s) Oral at bedtime  metoprolol tartrate 12.5 milliGRAM(s) Oral two times a day  pantoprazole    Tablet 40 milliGRAM(s) Oral before breakfast      Vitals:  T(C): 36.7 (18 @ 19:28), Max: 36.7 (18 @ 10:30)  HR: 74 (18 @ 19:28) (65 - 76)  BP: 124/87 (18 @ 19:28) (107/73 - 144/60)  BP(mean): 101 (18 @ 11:10) (101 - 101)  RR: 17 (18 @ 19:28) (16 - 18)  SpO2: 100% (18 @ 19:28) (96% - 100%)  Wt(kg): --  Daily Height in cm: 162.56 (23 Mar 2018 11:10)    Daily Weight in k (23 Mar 2018 11:10)  I&O's Summary    23 Mar 2018 07:01  -  24 Mar 2018 02:00  --------------------------------------------------------  IN: 0 mL / OUT: 0 mL / NET: 0 mL          Physical Exam:  Appearance: Normal  Eyes: PERRL, EOMI  HENT: Normal oral muscosa, NC/AT  Cardiovascular: S1S2, RRR, No M/R/G, no JVD, No Lower extremity edema  Procedural Access Site: Right femoral artery No hematoma, Non-tender to palpation, 2+ pulse, No bruit, No Ecchymosis  Respiratory: Clear to auscultation bilaterally  Gastrointestinal: Soft, Non tender, Normal Bowel Sounds  Musculoskeletal: No clubbing, No joint deformity   Neurologic: Non-focal  Lymphatic: No lymphadenopathy  Psychiatry: AAOx3, Mood & affect appropriate  Skin: No rashes, No ecchymoses, No cyanosis        140  |  101  |  27<H>  ----------------------------<  116<H>  4.1   |  22  |  0.89    Ca    9.4      24 Mar 2018 00:13  Phos  4.4       Mg     2.4         TPro  7.7  /  Alb  3.9  /  TBili  0.3  /  DBili  x   /  AST  20  /  ALT  23  /  AlkPhos  97        CARDIAC MARKERS ( 23 Mar 2018 07:38 )  <0.015 ng/mL / x     / 49 U/L / x     / <1.0 ng/mL  CARDIAC MARKERS ( 23 Mar 2018 01:55 )  <0.015 ng/mL / x     / 59 U/L / x     / <1.0 ng/mL  CARDIAC MARKERS ( 22 Mar 2018 16:58 )  <0.015 ng/mL / x     / 91 U/L / x     / <1.0 ng/mL      Serum Pro-Brain Natriuretic Peptide: 35 pg/mL ( @ 16:58)    Lipid panel   Hgb A1c Hemoglobin A1C, Whole Blood: 5.7 % ( @ 09:35)      Interpretation of Telemetry:    ASSESSMENT/PLAN   Patient is a 68y old  Female who presents with a chief complaint of chest pain and shortness of breath (23 Mar 2018 15:06) now s/p diagnostic cath. Pt tolerated the procedure well, cardiac cath site benign. EP consult pending for NSVT Patient is a 68y old  Female who presents with a chief complaint of chest pain and shortness of breath (23 Mar 2018 15:06) now s/p diagnostic cath       Allergies    No Known Allergies    Intolerances        Medications:  aspirin enteric coated 81 milliGRAM(s) Oral daily  atorvastatin 40 milliGRAM(s) Oral at bedtime  metoprolol tartrate 12.5 milliGRAM(s) Oral two times a day  pantoprazole    Tablet 40 milliGRAM(s) Oral before breakfast      Vitals:  T(C): 36.7 (18 @ 19:28), Max: 36.7 (18 @ 10:30)  HR: 74 (18 @ 19:28) (65 - 76)  BP: 124/87 (18 @ 19:28) (107/73 - 144/60)  BP(mean): 101 (18 @ 11:10) (101 - 101)  RR: 17 (18 @ 19:28) (16 - 18)  SpO2: 100% (18 @ 19:28) (96% - 100%)  Wt(kg): --  Daily Height in cm: 162.56 (23 Mar 2018 11:10)    Daily Weight in k (23 Mar 2018 11:10)  I&O's Summary    23 Mar 2018 07:01  -  24 Mar 2018 02:00  --------------------------------------------------------  IN: 0 mL / OUT: 0 mL / NET: 0 mL          Physical Exam:  Appearance: Normal  Eyes: PERRL, EOMI  HENT: Normal oral muscosa, NC/AT  Cardiovascular: S1S2, RRR, No M/R/G, no JVD, No Lower extremity edema  Procedural Access Site: Right femoral artery No hematoma, Non-tender to palpation, 2+ pulse, No bruit, No Ecchymosis  Respiratory: Clear to auscultation bilaterally  Gastrointestinal: Soft, Non tender, Normal Bowel Sounds  Musculoskeletal: No clubbing, No joint deformity   Neurologic: Non-focal  Lymphatic: No lymphadenopathy  Psychiatry: AAOx3, Mood & affect appropriate  Skin: No rashes, No ecchymoses, No cyanosis        140  |  101  |  27<H>  ----------------------------<  116<H>  4.1   |  22  |  0.89    Ca    9.4      24 Mar 2018 00:13  Phos  4.4       Mg     2.4         TPro  7.7  /  Alb  3.9  /  TBili  0.3  /  DBili  x   /  AST  20  /  ALT  23  /  AlkPhos  97        CARDIAC MARKERS ( 23 Mar 2018 07:38 )  <0.015 ng/mL / x     / 49 U/L / x     / <1.0 ng/mL  CARDIAC MARKERS ( 23 Mar 2018 01:55 )  <0.015 ng/mL / x     / 59 U/L / x     / <1.0 ng/mL  CARDIAC MARKERS ( 22 Mar 2018 16:58 )  <0.015 ng/mL / x     / 91 U/L / x     / <1.0 ng/mL      Serum Pro-Brain Natriuretic Peptide: 35 pg/mL ( @ 16:58)    Lipid panel   Hgb A1c Hemoglobin A1C, Whole Blood: 5.7 % ( @ 09:35)      Interpretation of Telemetry: SR 60 bpm     ASSESSMENT/PLAN   Patient is a 68y old  Female who presents with a chief complaint of chest pain and shortness of breath (23 Mar 2018 15:06) now s/p diagnostic cath. Pt tolerated the procedure well, cardiac cath site benign. EP consult pending for NSVT

## 2018-03-24 NOTE — CONSULT NOTE ADULT - SUBJECTIVE AND OBJECTIVE BOX
E L E C T R O  P H Y S I O L O G Y     CONSULTATION NOTE   ________________________________________________      CHIEF COMPLAINT:Patient is a 68y old  Female who presents with a chief complaint of chest pain and shortness of breath (23 Mar 2018 15:06)  told to have wct on tele in .    HPI:  This is a 67 yo  female, only speaks Ukrainian,  # [id number], w/ PMHx of HLD and GERD, CAD s/[p PTCA 2017 at [facility]; significant FH for premature CAD- mother passed away from MI at 45 y/o.. On 3/22 went to [facility] Ed as adviced by her cardiologist Dr Bobby Salinas with c/c of  intermittent CP with associated SOB x2 weeks, and "EKG changes'. In ED patient  endorsed 8/10 intermittent left sided dull pain with radiation to the back that self resolves in less than 7 minutes, comes on about 4x/day, improves while lying flat. Additionally, pt endorsed subjective fever, chills, decreased appetite, nausea, dizziness, coughing w/o sputum production and runny nose for a week. Overnight was still having chest pain which improved with SL NTG. She had NSVT on tele at 5:55 am. CE neg X3, Was started on ACS Heparin gtt, started on ASA and BB;  transferred to [facility] for cardiac cath w/ possible intervention.   Of note: Pt recently travelled to [place] for 10 days and returned 2 days ago. Pt also endorsed dysuria for past few days. No notable sick contacts.   -on arrival to [facility] pt verbal minl shortness of breath at rest, denies any chest pain, dizziness, palpitations, orthopnea, N&V, Ha. abd pain. Awaiting on cath. (23 Mar 2018 11:10)      PAST MEDICAL & SURGICAL HISTORY:  History of percutaneous coronary intervention  CAD (coronary artery disease)  GERD (gastroesophageal reflux disease)  HLD (hyperlipidemia)  H/O cardiac catheterization  H/O knee surgery  S/P ASHLEY-BSO  Trauma to eye  H/O bilateral hip replacements      MEDICATIONS  (STANDING):  aspirin enteric coated 81 milliGRAM(s) Oral daily  atorvastatin 40 milliGRAM(s) Oral at bedtime  metoprolol tartrate 12.5 milliGRAM(s) Oral two times a day  pantoprazole    Tablet 40 milliGRAM(s) Oral before breakfast    MEDICATIONS  (PRN):      FAMILY HISTORY:  Family history of leukemia (Father)  Family history of MI (myocardial infarction) (Mother)      SOCIAL HISTORY:    [ ] Non-smoker  [ ] Smoker  [ ] Alcohol    Allergies    No Known Allergies    Intolerances    	    REVIEW OF SYSTEMS:  CONSTITUTIONAL: No fever, weight loss, or fatigue  EYES: No eye pain, visual disturbances, or discharge  ENT:  No difficulty hearing, tinnitus, vertigo; No sinus or throat pain  NECK: No pain or stiffness  RESPIRATORY: No cough, wheezing, chills or hemoptysis; + Shortness of Breath  CARDIOVASCULAR: No chest pain, palpitations, passing out, dizziness, or leg swelling  GASTROINTESTINAL: No abdominal or epigastric pain. No nausea, vomiting, or hematemesis; No diarrhea or constipation. No melena or hematochezia.  GENITOURINARY: No dysuria, frequency, hematuria, or incontinence  NEUROLOGICAL: No headaches, memory loss, loss of strength, numbness, or tremors  SKIN: No itching, burning, rashes, or lesions   LYMPH Nodes: No enlarged glands  ENDOCRINE: No heat or cold intolerance; No hair loss  MUSCULOSKELETAL: No joint pain or swelling; No muscle, back, or extremity pain  PSYCHIATRIC: No depression, anxiety, mood swings, or difficulty sleeping  HEME/LYMPH: No easy bruising, or bleeding gums  ALLERGY AND IMMUNOLOGIC: No hives or eczema	    [ ] All others negative	  [ ] Unable to obtain    PHYSICAL EXAM:  T(C): 36.6 (03-24-18 @ 05:32), Max: 36.7 (03-23-18 @ 10:30)  HR: 70 (03-24-18 @ 05:32) (65 - 76)  BP: 133/66 (03-24-18 @ 05:32) (107/73 - 141/84)  RR: 18 (03-24-18 @ 05:32) (16 - 18)  SpO2: 100% (03-24-18 @ 05:32) (96% - 100%)  Wt(kg): --  I&O's Summary    23 Mar 2018 07:01  -  24 Mar 2018 07:00  --------------------------------------------------------  IN: 140 mL / OUT: 0 mL / NET: 140 mL    24 Mar 2018 07:01  -  24 Mar 2018 09:15  --------------------------------------------------------  IN: 240 mL / OUT: 0 mL / NET: 240 mL        Appearance: Normal	  HEENT:   Normal oral mucosa, PERRL, EOMI	  Lymphatic: No lymphadenopathy  Cardiovascular: Normal S1 S2, No JVD, + murmurs, No edema  Respiratory: Lungs clear to auscultation	  Psychiatry: A & O x 3, Mood & affect appropriate  Gastrointestinal:  Soft, Non-tender, + BS	  Skin: No rashes, No ecchymoses, No cyanosis	  Neurologic: Non-focal  Extremities: Normal range of motion, No clubbing, cyanosis or edema  Vascular: Peripheral pulses palpable 2+ bilaterally    TELEMETRY: 	    ECG:  	  RADIOLOGY:  OTHER: 	  	  LABS:	 	    CARDIAC MARKERS:  CARDIAC MARKERS ( 23 Mar 2018 07:38 )  <0.015 ng/mL / x     / 49 U/L / x     / <1.0 ng/mL  CARDIAC MARKERS ( 23 Mar 2018 01:55 )  <0.015 ng/mL / x     / 59 U/L / x     / <1.0 ng/mL  CARDIAC MARKERS ( 22 Mar 2018 16:58 )  <0.015 ng/mL / x     / 91 U/L / x     / <1.0 ng/mL                              13.3   4.7   )-----------( 249      ( 24 Mar 2018 00:13 )             39.8     03-24    140  |  101  |  27<H>  ----------------------------<  116<H>  4.1   |  22  |  0.89    Ca    9.4      24 Mar 2018 00:13  Phos  4.4     03-23  Mg     2.4     03-23    TPro  7.7  /  Alb  3.9  /  TBili  0.3  /  DBili  x   /  AST  20  /  ALT  23  /  AlkPhos  97  03-22    proBNP:   Lipid Profile:   HgA1c: Hemoglobin A1C, Whole Blood: 5.7 % (03-23 @ 09:35)    TSH:     PREVIOUS DIAGNOSTIC TESTING:

## 2018-03-24 NOTE — CONSULT NOTE ADULT - ASSESSMENT
pt with hx of cad with 2 weeks hx of chest pain ?wct the rythm not avaiable.  no hx of syncope/no fhx of sudden death  copntinue beta blocker  obseve on tele  echo  try to find the rythm from FH  sed rate/ crp

## 2018-03-25 LAB — TSH SERPL-MCNC: 4.45 UIU/ML — HIGH (ref 0.27–4.2)

## 2018-03-25 PROCEDURE — 93010 ELECTROCARDIOGRAM REPORT: CPT

## 2018-03-25 PROCEDURE — 71275 CT ANGIOGRAPHY CHEST: CPT | Mod: 26

## 2018-03-25 RX ADMIN — PANTOPRAZOLE SODIUM 40 MILLIGRAM(S): 20 TABLET, DELAYED RELEASE ORAL at 05:35

## 2018-03-25 RX ADMIN — Medication 50 MILLIGRAM(S): at 05:35

## 2018-03-25 RX ADMIN — ATORVASTATIN CALCIUM 40 MILLIGRAM(S): 80 TABLET, FILM COATED ORAL at 21:04

## 2018-03-25 RX ADMIN — Medication 81 MILLIGRAM(S): at 12:40

## 2018-03-25 NOTE — PROGRESS NOTE ADULT - ASSESSMENT
no further wct awaiting record from Julio kim  if increase d dimer will get cta  continue beta blocker

## 2018-03-25 NOTE — PROGRESS NOTE ADULT - SUBJECTIVE AND OBJECTIVE BOX
E L E C T R O  P H Y S I O L O G Y     PROGRESS  NOTE   ________________________________________________    CHIEF COMPLAINT:Patient is a 68y old  Female who presents with a chief complaint of chest pain and shortness of breath (23 Mar 2018 15:06)  doing bwetter  	  REVIEW OF SYSTEMS:  CONSTITUTIONAL: No fever, weight loss, or fatigue  EYES: No eye pain, visual disturbances, or discharge  ENT:  No difficulty hearing, tinnitus, vertigo; No sinus or throat pain  NECK: No pain or stiffness  RESPIRATORY: No cough, wheezing, chills or hemoptysis; No Shortness of Breath  CARDIOVASCULAR: No chest pain, palpitations, passing out, dizziness, or leg swelling  GASTROINTESTINAL: No abdominal or epigastric pain. No nausea, vomiting, or hematemesis; No diarrhea or constipation. No melena or hematochezia.  GENITOURINARY: No dysuria, frequency, hematuria, or incontinence  NEUROLOGICAL: No headaches, memory loss, loss of strength, numbness, or tremors  SKIN: No itching, burning, rashes, or lesions   LYMPH Nodes: No enlarged glands  ENDOCRINE: No heat or cold intolerance; No hair loss  MUSCULOSKELETAL: No joint pain or swelling; No muscle, back, or extremity pain  PSYCHIATRIC: No depression, anxiety, mood swings, or difficulty sleeping  HEME/LYMPH: No easy bruising, or bleeding gums  ALLERGY AND IMMUNOLOGIC: No hives or eczema	    [ ] All others negative	  [ ] Unable to obtain    PHYSICAL EXAM:  T(C): 36.3 (03-25-18 @ 04:28), Max: 36.6 (03-24-18 @ 11:45)  HR: 81 (03-25-18 @ 05:34) (63 - 81)  BP: 120/86 (03-25-18 @ 05:34) (110/71 - 120/86)  RR: 18 (03-25-18 @ 04:28) (18 - 18)  SpO2: 97% (03-25-18 @ 04:28) (95% - 99%)  Wt(kg): --  I&O's Summary    24 Mar 2018 07:01  -  25 Mar 2018 07:00  --------------------------------------------------------  IN: 480 mL / OUT: 0 mL / NET: 480 mL        Appearance: Normal	  HEENT:   Normal oral mucosa, PERRL, EOMI	  Lymphatic: No lymphadenopathy  Cardiovascular: Normal S1 S2, No JVD, No murmurs, No edema  Respiratory: Lungs clear to auscultation	  Psychiatry: A & O x 3, Mood & affect appropriate  Gastrointestinal:  Soft, Non-tender, + BS	  Skin: No rashes, No ecchymoses, No cyanosis	  Neurologic: Non-focal  Extremities: Normal range of motion, No clubbing, cyanosis or edema  Vascular: Peripheral pulses palpable 2+ bilaterally    MEDICATIONS  (STANDING):  aspirin enteric coated 81 milliGRAM(s) Oral daily  atorvastatin 40 milliGRAM(s) Oral at bedtime  metoprolol succinate ER 50 milliGRAM(s) Oral daily  pantoprazole    Tablet 40 milliGRAM(s) Oral before breakfast      TELEMETRY: nstr 60/80	    ECG:  	  RADIOLOGY:  OTHER: 	  	  LABS:	 	    CARDIAC MARKERS:                                13.3   4.7   )-----------( 249      ( 24 Mar 2018 00:13 )             39.8     03-24    140  |  101  |  27<H>  ----------------------------<  116<H>  4.1   |  22  |  0.89    Ca    9.4      24 Mar 2018 00:13      proBNP: Serum Pro-Brain Natriuretic Peptide: 35 pg/mL (03-22 @ 16:58)    Lipid Profile: Cholesterol 255    HDL 59      HgA1c: Hemoglobin A1C, Whole Blood: 5.7 % (03-23 @ 09:35)    TSH: Thyroid Stimulating Hormone, Serum: 4.45 uIU/mL (03-25 @ 09:16)  Thyroid Stimulating Hormone, Serum: 1.93 uU/mL (03-22 @ 16:58)      < from: 12 Lead ECG (03.23.18 @ 10:44) >  Ventricular Rate 72 BPM    Atrial Rate 72 BPM    P-R Interval 148 ms    QRS Duration 88 ms     ms    QTc 420 ms    P Axis 43 degrees    R Axis 10 degrees    T Axis 27 degrees    Diagnosis Line NORMAL SINUS RHYTHM  NONSPECIFIC T WAVE ABNORMALITY  ABNORMAL ECG    < end of copied text >  	  < from: Transthoracic Echocardiogram (03.24.18 @ 11:34) >  Conclusions:  1. Calcified trileaflet aortic valve with normal opening.  Mild aortic regurgitation.  2. Normal left ventricular systolic function. No segmental  wall motion abnormalities.  3. Normal diastolic function  4. The right ventricle is not well visualized; grossly  normal right ventricular systolic function. TAPSE 2.1 cm  (normal).  5. Trace pericardial effusion.    < end of copied text >    D-Dimer Assay, Quantitative (03.24.18 @ 10:58)    D-Dimer Assay, Quantitative: 456: D-Dimer result less than 230 ng/mL DDU correlates with the absence  of thrombosis in a patient with a low and moderate       pre-test probability of thrombosis.  1 DDU is approximately equal to  2 ng/mL FEU (previous units). ng/mL DDU

## 2018-03-26 VITALS
RESPIRATION RATE: 18 BRPM | DIASTOLIC BLOOD PRESSURE: 74 MMHG | HEART RATE: 69 BPM | SYSTOLIC BLOOD PRESSURE: 110 MMHG | OXYGEN SATURATION: 98 % | TEMPERATURE: 98 F

## 2018-03-26 LAB
ANION GAP SERPL CALC-SCNC: 12 MMOL/L — SIGNIFICANT CHANGE UP (ref 5–17)
BUN SERPL-MCNC: 22 MG/DL — SIGNIFICANT CHANGE UP (ref 7–23)
CALCIUM SERPL-MCNC: 9.6 MG/DL — SIGNIFICANT CHANGE UP (ref 8.4–10.5)
CHLORIDE SERPL-SCNC: 101 MMOL/L — SIGNIFICANT CHANGE UP (ref 96–108)
CO2 SERPL-SCNC: 26 MMOL/L — SIGNIFICANT CHANGE UP (ref 22–31)
CREAT SERPL-MCNC: 0.68 MG/DL — SIGNIFICANT CHANGE UP (ref 0.5–1.3)
GLUCOSE SERPL-MCNC: 91 MG/DL — SIGNIFICANT CHANGE UP (ref 70–99)
HCT VFR BLD CALC: 41.4 % — SIGNIFICANT CHANGE UP (ref 34.5–45)
HGB BLD-MCNC: 13.2 G/DL — SIGNIFICANT CHANGE UP (ref 11.5–15.5)
MCHC RBC-ENTMCNC: 29.1 PG — SIGNIFICANT CHANGE UP (ref 27–34)
MCHC RBC-ENTMCNC: 31.9 GM/DL — LOW (ref 32–36)
MCV RBC AUTO: 91.2 FL — SIGNIFICANT CHANGE UP (ref 80–100)
NRBC # BLD: 0 /100 WBCS — SIGNIFICANT CHANGE UP (ref 0–0)
PLATELET # BLD AUTO: 239 K/UL — SIGNIFICANT CHANGE UP (ref 150–400)
POTASSIUM SERPL-MCNC: 4.4 MMOL/L — SIGNIFICANT CHANGE UP (ref 3.5–5.3)
POTASSIUM SERPL-SCNC: 4.4 MMOL/L — SIGNIFICANT CHANGE UP (ref 3.5–5.3)
RBC # BLD: 4.54 M/UL — SIGNIFICANT CHANGE UP (ref 3.8–5.2)
RBC # FLD: 14.6 % — HIGH (ref 10.3–14.5)
SODIUM SERPL-SCNC: 139 MMOL/L — SIGNIFICANT CHANGE UP (ref 135–145)
WBC # BLD: 4.1 K/UL — SIGNIFICANT CHANGE UP (ref 3.8–10.5)
WBC # FLD AUTO: 4.1 K/UL — SIGNIFICANT CHANGE UP (ref 3.8–10.5)

## 2018-03-26 PROCEDURE — 80048 BASIC METABOLIC PNL TOTAL CA: CPT

## 2018-03-26 PROCEDURE — 84443 ASSAY THYROID STIM HORMONE: CPT

## 2018-03-26 PROCEDURE — 93458 L HRT ARTERY/VENTRICLE ANGIO: CPT

## 2018-03-26 PROCEDURE — 85027 COMPLETE CBC AUTOMATED: CPT

## 2018-03-26 PROCEDURE — C1769: CPT

## 2018-03-26 PROCEDURE — 85379 FIBRIN DEGRADATION QUANT: CPT

## 2018-03-26 PROCEDURE — 86140 C-REACTIVE PROTEIN: CPT

## 2018-03-26 PROCEDURE — 99153 MOD SED SAME PHYS/QHP EA: CPT

## 2018-03-26 PROCEDURE — C1894: CPT

## 2018-03-26 PROCEDURE — 99152 MOD SED SAME PHYS/QHP 5/>YRS: CPT

## 2018-03-26 PROCEDURE — 71275 CT ANGIOGRAPHY CHEST: CPT

## 2018-03-26 PROCEDURE — 93005 ELECTROCARDIOGRAM TRACING: CPT

## 2018-03-26 PROCEDURE — C1887: CPT

## 2018-03-26 PROCEDURE — 93306 TTE W/DOPPLER COMPLETE: CPT

## 2018-03-26 PROCEDURE — 85652 RBC SED RATE AUTOMATED: CPT

## 2018-03-26 RX ORDER — METOPROLOL TARTRATE 50 MG
1 TABLET ORAL
Qty: 30 | Refills: 0 | OUTPATIENT
Start: 2018-03-26 | End: 2018-04-24

## 2018-03-26 RX ADMIN — PANTOPRAZOLE SODIUM 40 MILLIGRAM(S): 20 TABLET, DELAYED RELEASE ORAL at 05:56

## 2018-03-26 RX ADMIN — Medication 50 MILLIGRAM(S): at 10:07

## 2018-03-26 RX ADMIN — Medication 81 MILLIGRAM(S): at 10:07

## 2018-03-26 NOTE — CHART NOTE - NSCHARTNOTEFT_GEN_A_CORE
Pt admitted for w/u of CP.  S/P diagnostic cath.  CTA negative for PE however noted to have a 1.4 cm right suprarenal hyperdense lesion and periportal hypodense lymph nodes. Pre and postcontrast CT of the abdomen/pelvis recommended for further evaluation.  Findings discussed with pt and pts granddaughter who wish to go home and follow up results as out pt with pt's PMD, Dr. Yee.  Pt and granddaughter verbalized understanding of importance of following up CTA results as out pt.  Discussed with Dr. Shelley who agrees with plan. D/C to home, f/u with Dr. Shelley in 1 week.      #[id number]

## 2018-03-26 NOTE — PROGRESS NOTE ADULT - SUBJECTIVE AND OBJECTIVE BOX
- Patient seen and examined.  - In summary, patient is a 68 year old woman who presented with chest pain and shortness of breath (23 Mar 2018 15:06)  - Today, patient is without complaints.         *****MEDICATIONS:    MEDICATIONS  (STANDING):  aspirin enteric coated 81 milliGRAM(s) Oral daily  atorvastatin 40 milliGRAM(s) Oral at bedtime  metoprolol succinate ER 50 milliGRAM(s) Oral daily  pantoprazole    Tablet 40 milliGRAM(s) Oral before breakfast    MEDICATIONS  (PRN):           ***** REVIEW OF SYSTEM:  GEN: no fever, no chills, no pain  RESP: no SOB, no cough, no sputum  CVS: no chest pain, no palpitations, no edema  GI: no abdominal pain, no nausea, no vomiting, no constipation, no diarrhea  : no dysuria, no frequency  NEURO: no headache, no dizziness  PSYCH: no depression, not anxious  Derm : no itching, no rash         ***** VITAL SIGNS:  T(F): 97.5 (18 @ 04:29), Max: 97.6 (18 @ 13:58)  HR: 67 (18 @ 04:29) (64 - 77)  BP: 100/57 (18 @ 04:29) (100/57 - 113/72)  RR: 18 (18 @ 04:29) (18 - 18)  SpO2: 97% (18 @ 04:29) (96% - 98%)  Wt(kg): --  ,   I&O's Summary    25 Mar 2018 07:01  -  26 Mar 2018 07:00  --------------------------------------------------------  IN: 865 mL / OUT: 0 mL / NET: 865 mL             *****PHYSICAL EXAM:  GEN: A&O X 3 , NAD , comfortable  HEENT: NCAT, EOMI, MMM, no icterus  NECK: Supple, No JVD  CVS: S1S2 , regular , No M/R/G appreciated  PULM: CTA B/L,  no W/R/R appreciated  ABD.: soft. non tender, non distended,  bowel sounds present  Extrem: intact pulses , no edema noted  Derm: No rash or ecchymosis noted  PSYCH: normal mood, no depression, not anxious         *****LAB AND IMAGIN.2   4.10  )-----------( 239      ( 26 Mar 2018 06:52 )             41.4                   139  |  101  |  22  ----------------------------<  91  4.4   |  26  |  0.68    Ca    9.6      26 Mar 2018 06:33                [All pertinent recent Imaging/Reports reviewed]  < from: Transthoracic Echocardiogram (18 @ 11:34) >  Conclusions:  1. Calcified trileaflet aortic valve with normal opening.  Mild aortic regurgitation.  2. Normal left ventricular systolic function. No segmental  wall motion abnormalities.  3. Normal diastolic function  4. The right ventricle is not well visualized; grossly  normal right ventricular systolic function. TAPSE 2.1 cm  (normal).  5. Trace pericardial effusion.    < end of copied text >         *****A S S E S S M E N T   A N D   P L A N :  68F w/ PMHx of CAD, HLD and GERD adm with cp, sob and NSVT.  Echocardiogram noted  cath with non obstructive disease (minor)  cont asa, b blocker, statin.  f/u EPS  CTA pending (prelim no PE)  DCP    __________________________  CONRAD Shelley D.O.

## 2019-01-02 NOTE — PATIENT PROFILE ADULT. - PRIMARY CARE PHYSICIAN, PROFILE
SUBJECTIVE:      Patient ID: Reinaldo Islas is a 65 y.o. female.    Chief Complaint: nose bleeds and Breast Pain (left)    Patient is here today complaining of a nose bleed 3 days ago. Had one episode of bleeding which she controlled with pinching her nose, the bleeding stopped on its own. Has not had another episode of nose bleeds but she was concerned so she wanted to have it checked out. Says she forgot to take her blood pressure medication the same day and her reading at home was 150/86. She also admits to a poor diet over the holidays including drinking cokes and coffee, eating chocolate. Has gained 5 pounds in the past month. Was also having left breast tenderness a week ago but it has since resolved.         Past Surgical History:   Procedure Laterality Date    APPENDECTOMY  9/28/15    reports [facility] cancer to [facility]    breast reduction      BREAST SURGERY      reduction    CHOLECYSTECTOMY      COLON SURGERY      COLONOSCOPY  10/2014    COLONOSCOPY  02/2016    Dr. Llamas: one colon polyp removed, diverticulosis    COLONOSCOPY N/A 10/16/2014    [facility]    COLONOSCOPY N/A 12/12/2012    [facility]    CYSTO WITH HYDRODESTENTION  6/1/2015    [facility] at [facility] OR    CYSTOSCOPY      EGD (ESOPHAGOGASTRODUODENOSCOPY) N/A 10/22/2013    [facility] by [facility] END[facility]    ESOPHAGOGASTRODUODENOSCOPY (EGD) N/A 9/7/2017    Per[facility] by [facility]li [facility]s [facility] at [facility] ENDO    JOINT REPLACEMENT      Left Knee x 2    MYRINGOTOMY-INSERTION OF TUBE Left 2/6/2013    [facility]    TUBAL LIGATION      TYMPANOSTOMY TUBE PLACEMENT      left    TYMPANOSTOMY TUBE PLACEMENT      UPPER GASTROINTESTINAL ENDOSCOPY  10/2013    UPPER GASTROINTESTINAL ENDOSCOPY  07/2016    Dr. Llamas: non h pylori gastritis     Family History   Problem Relation Age of Onset     Kidney disease Mother     Colon polyps Mother     Stomach cancer Mother     Cancer Mother     Hypertension Mother     Arthritis Mother     Hearing loss Mother     Cancer Father     Colon cancer Maternal Grandmother     Diabetes Sister     Hypertension Sister     Prostate cancer Neg Hx     Urolithiasis Neg Hx       Social History     Socioeconomic History    Marital status: Single     Spouse name: None    Number of children: None    Years of education: None    Highest education level: None   Social Needs    Financial resource strain: None    Food insecurity - worry: None    Food insecurity - inability: None    Transportation needs - medical: None    Transportation needs - non-medical: None   Occupational History    None   Tobacco Use    Smoking status: Never Smoker    Smokeless tobacco: Never Used   Substance and Sexual Activity    Alcohol use: No    Drug use: No    Sexual activity: Yes     Partners: Male   Other Topics Concern    None   Social History Narrative    None     Current Outpatient Medications   Medication Sig Dispense Refill    albuterol (PROVENTIL/VENTOLIN HFA) 90 mcg/actuation inhaler Ventolin HFA 90 mcg/actuation aerosol inhaler      blood-glucose meter (TRUE METRIX GLUCOSE METER) Misc 1 each by Misc.(Non-Drug; Combo Route) route once daily. 1 each 0    dicyclomine (BENTYL) 20 mg tablet TAKE 1 TABLET BY MOUTH FOUR TIMES DAILY AS NEEDED 90 tablet 0    ergocalciferol (ERGOCALCIFEROL) 50,000 unit Cap Take 1 capsule (50,000 Units total) by mouth every 7 days. 4 capsule 2    esomeprazole (NEXIUM) 20 MG capsule Take 2 capsules (40 mg total) by mouth before breakfast. 90 capsule 3    FLONASE ALLERGY RELIEF 50 mcg/actuation nasal spray 2 sprays (100 mcg total) by Each Nare route once daily. 16 g 11    ibuprofen (ADVIL,MOTRIN) 800 MG tablet TAKE 1 TABLET(800 MG) BY MOUTH THREE TIMES DAILY AS NEEDED FOR PAIN 90 tablet 0    Lactobacillus rhamnosus GG (CULTURELLE) 10 billion  cell capsule Take 1 capsule by mouth once daily.      loratadine (CLARITIN) 10 mg tablet TAKE 1 TABLET BY MOUTH EVERY DAY 30 tablet 11    losartan-hydrochlorothiazide 50-12.5 mg (HYZAAR) 50-12.5 mg per tablet Take 1 tablet by mouth once daily. 30 tablet 3    montelukast (SINGULAIR) 10 mg tablet TK ONE T PO QPM 30 tablet 5    oxyCODONE-acetaminophen (PERCOCET)  mg per tablet       pentosan polysulfate (ELMIRON) 100 mg Cap Take 1 capsule (100 mg total) by mouth 3 (three) times daily. 270 capsule 3    potassium chloride SA (K-DUR,KLOR-CON) 20 MEQ tablet TAKE 1 TABLET(20 MEQ) BY MOUTH EVERY DAY 30 tablet 5    ranitidine (ZANTAC) 150 MG capsule Take 2 capsules (300 mg total) by mouth nightly. 90 capsule 3    TRUEPLUS LANCETS 33 gauge Misc USE ONCE DAILY  0    TRUETEST TEST STRIPS Strp        No current facility-administered medications for this visit.      Review of patient's allergies indicates:   Allergen Reactions    Betadine [povidone-iodine] Rash    Iodine Hives    Penicillin g Itching      Past Medical History:   Diagnosis Date    Allergy     Anemia     Arthritis     osteoarthritis    Asthma     seasonal induced.  Last summer 2012    Back pain     Cataract     Colon polyp     Diverticulosis     GERD (gastroesophageal reflux disease)     Hiatal hernia     Hypertension     IC (interstitial cystitis)     Interstitial cystitis     Irritable bowel syndrome     Vitamin D deficiency     Wears partial dentures     front top center, gold     Past Surgical History:   Procedure Laterality Date    [facility]  9/28/15    [facility]    breast reduction      BREAST SURGERY      reduction    CHOLECYSTECTOMY      COLON SURGERY      COLONOSCOPY  10/2014    COLONOSCOPY  02/2016    Dr. Llamas: one colon polyp removed, diverticulosis    COLONOSCOPY N/A 10/16/2014    [facility]    COLONOSCOPY N/A 12/12/2012    Performed by Martin GUILLEN  "[facility]    CYSTO WITH HYDRODESTENTION  6/1/2015    [facility] OR    CYSTOSCOPY      EGD (ESOPHAGOGASTRODUODENOSCOPY) N/A 10/22/2013    [facility] by [facility]    ESOPHAGOGASTRODUODENOSCOPY (EGD) N/A 9/7/2017    Per[facility] by [facility]li [facility] at [facility]    JOINT REPLACEMENT      Left Knee x 2    MYRINGOTOMY-INSERTION OF TUBE Left 2/6/2013    [facility]    TUBAL LIGATION      TYMPANOSTOMY TUBE PLACEMENT      left    TYMPANOSTOMY TUBE PLACEMENT      UPPER GASTROINTESTINAL ENDOSCOPY  10/2013    UPPER GASTROINTESTINAL ENDOSCOPY  07/2016    Dr. Llamas: non h pylori gastritis       Review of Systems   Constitutional: Negative for appetite change, chills, diaphoresis and unexpected weight change.   HENT: Positive for nosebleeds (one episode ). Negative for ear discharge, hearing loss, trouble swallowing and voice change.    Eyes: Negative for photophobia and pain.   Respiratory: Negative for chest tightness, shortness of breath, wheezing and stridor.    Cardiovascular: Negative for chest pain and palpitations.   Gastrointestinal: Negative for abdominal pain, blood in stool and vomiting.   Endocrine: Negative for cold intolerance and heat intolerance.   Genitourinary: Negative for difficulty urinating and flank pain.   Musculoskeletal: Negative for joint swelling and neck stiffness.   Skin: Negative for pallor.   Neurological: Negative for dizziness, speech difficulty, light-headedness and headaches.   Hematological: Does not bruise/bleed easily.   Psychiatric/Behavioral: Negative for confusion.      OBJECTIVE:      Vitals:    01/02/19 1358 01/02/19 1425   BP: (!) 140/80 136/86   Pulse: 72    SpO2: 98%    Weight: 121.6 kg (268 lb)    Height: 5' 6" (1.676 m)      Physical Exam   Constitutional: She is oriented to person, place, and time. She appears well-developed and well-nourished.   HENT:   Head: " Atraumatic.   Right Ear: Tympanic membrane normal.   Left Ear: Tympanic membrane normal.   Nose: Nose normal.   Mouth/Throat: Oropharynx is clear and moist and mucous membranes are normal.   Eyes: Conjunctivae are normal.   Neck: Neck supple.   Cardiovascular: Normal rate, regular rhythm, normal heart sounds and intact distal pulses.   Pulmonary/Chest: Effort normal and breath sounds normal. She exhibits no mass and no tenderness. Right breast exhibits no mass, no nipple discharge, no skin change and no tenderness. Left breast exhibits no mass, no nipple discharge, no skin change and no tenderness.   Abdominal: Soft. Bowel sounds are normal. She exhibits no distension.   Musculoskeletal: Normal range of motion.   Neurological: She is alert and oriented to person, place, and time.   Skin: Skin is warm and dry.   Psychiatric: She has a normal mood and affect.   Nursing note and vitals reviewed.     Assessment:       1. Essential hypertension    2. Bleeding from the nose    3. Breast pain, left        Plan:       Essential hypertension  Monitor blood pressure at home 3x weekly and bring journal to f/u with Dr Moreno. If home readings are consistently >140/90 schedule sooner f/u.  Decrease caffeine in the diet  Low salt diet    Bleeding from the nose  Improved  Stop flonase  Aquaphor to bilat nares  Hydrate    Breast pain, left  Resolved     Follow-up if symptoms worsen or fail to improve.      1/2/2019 ROMERO Quintanilla, FNP       anny rosario

## 2020-01-22 NOTE — PATIENT PROFILE ADULT. - TRANSFUSION PREMEDICATION REQUIRED
none Anticipated Starting Dosage (Optional): 40mg Daily Ipledge Number (Optional): [id number] Patient Reported Weight (Optional - Include Units): 145 Detail Level: Zone

## 2021-01-19 PROBLEM — K21.9 GASTRO-ESOPHAGEAL REFLUX DISEASE WITHOUT ESOPHAGITIS: Chronic | Status: ACTIVE | Noted: 2018-03-22

## 2021-01-19 PROBLEM — E78.5 HYPERLIPIDEMIA, UNSPECIFIED: Chronic | Status: ACTIVE | Noted: 2018-03-22

## 2021-01-19 PROBLEM — I25.10 ATHEROSCLEROTIC HEART DISEASE OF NATIVE CORONARY ARTERY WITHOUT ANGINA PECTORIS: Chronic | Status: ACTIVE | Noted: 2018-03-23

## 2021-01-19 PROBLEM — Z98.890 OTHER SPECIFIED POSTPROCEDURAL STATES: Chronic | Status: ACTIVE | Noted: 2018-03-23

## 2021-01-19 PROBLEM — Z00.00 ENCOUNTER FOR PREVENTIVE HEALTH EXAMINATION: Status: ACTIVE | Noted: 2021-01-19

## 2021-01-26 ENCOUNTER — APPOINTMENT (OUTPATIENT)
Dept: RHEUMATOLOGY | Facility: CLINIC | Age: 71
End: 2021-01-26
Payer: MEDICARE

## 2021-01-26 VITALS
SYSTOLIC BLOOD PRESSURE: 152 MMHG | OXYGEN SATURATION: 98 % | DIASTOLIC BLOOD PRESSURE: 82 MMHG | WEIGHT: 164 LBS | BODY MASS INDEX: 28 KG/M2 | HEIGHT: 64 IN | HEART RATE: 76 BPM | TEMPERATURE: 98.5 F | RESPIRATION RATE: 20 BRPM

## 2021-01-26 DIAGNOSIS — Z82.49 FAMILY HISTORY OF ISCHEMIC HEART DISEASE AND OTHER DISEASES OF THE CIRCULATORY SYSTEM: ICD-10-CM

## 2021-01-26 DIAGNOSIS — M25.50 PAIN IN UNSPECIFIED JOINT: ICD-10-CM

## 2021-01-26 DIAGNOSIS — Z78.9 OTHER SPECIFIED HEALTH STATUS: ICD-10-CM

## 2021-01-26 DIAGNOSIS — H54.40 BLINDNESS, ONE EYE, UNSPECIFIED EYE: ICD-10-CM

## 2021-01-26 DIAGNOSIS — Z80.6 FAMILY HISTORY OF LEUKEMIA: ICD-10-CM

## 2021-01-26 DIAGNOSIS — M87.00 IDIOPATHIC ASEPTIC NECROSIS OF UNSPECIFIED BONE: ICD-10-CM

## 2021-01-26 DIAGNOSIS — E78.5 HYPERLIPIDEMIA, UNSPECIFIED: ICD-10-CM

## 2021-01-26 DIAGNOSIS — Z83.511 FAMILY HISTORY OF GLAUCOMA: ICD-10-CM

## 2021-01-26 PROCEDURE — 99072 ADDL SUPL MATRL&STAF TM PHE: CPT

## 2021-01-26 PROCEDURE — 99205 OFFICE O/P NEW HI 60 MIN: CPT

## 2021-01-26 RX ORDER — ATORVASTATIN CALCIUM 40 MG/1
40 TABLET, FILM COATED ORAL
Refills: 0 | Status: ACTIVE | COMMUNITY
Start: 2021-01-26

## 2021-01-26 RX ORDER — EUCALYPTUS OIL/MENTHOL/CAMPHOR 1.2%-4.8%
1000 OINTMENT (GRAM) TOPICAL
Refills: 0 | Status: ACTIVE | COMMUNITY
Start: 2021-01-26

## 2021-01-26 RX ORDER — ACETAMINOPHEN 325 MG/1
325 TABLET, FILM COATED ORAL
Refills: 0 | Status: ACTIVE | COMMUNITY
Start: 2021-01-26

## 2021-01-26 RX ORDER — MELOXICAM 15 MG/1
15 TABLET ORAL
Refills: 0 | Status: ACTIVE | COMMUNITY
Start: 2021-01-26

## 2021-01-26 NOTE — CONSULT LETTER
[Dear  ___] : Dear  [unfilled], [Consult Letter:] : I had the pleasure of evaluating your patient, [unfilled]. [Consult Closing:] : Thank you very much for allowing me to participate in the care of this patient.  If you have any questions, please do not hesitate to contact me. [Sincerely,] : Sincerely, [[email or web address]] : Elie Farfan MD

## 2021-01-26 NOTE — PHYSICAL EXAM
[Sclera] : the sclera and conjunctiva were normal [Examination Of The Oral Cavity] : the lips and gums were normal [Oropharynx] : the oropharynx was normal [Auscultation Breath Sounds / Voice Sounds] : lungs were clear to auscultation bilaterally [Heart Sounds] : normal S1 and S2 [Heart Sounds Gallop] : no gallops [Murmurs] : no murmurs [Heart Sounds Pericardial Friction Rub] : no pericardial rub [Abdomen Soft] : soft [Abdomen Tenderness] : non-tender [] : no hepato-splenomegaly [Cervical Lymph Nodes Enlarged Posterior Bilaterally] : posterior cervical [Cervical Lymph Nodes Enlarged Anterior Bilaterally] : anterior cervical [Supraclavicular Lymph Nodes Enlarged Bilaterally] : supraclavicular [Axillary Lymph Nodes Enlarged Bilaterally] : axillary [Oriented To Time, Place, And Person] : oriented to person, place, and time [FreeTextEntry1] : no synovitis on exam. Multiple tender points. R knee w crepitus. FROM of all joints.

## 2021-01-26 NOTE — REVIEW OF SYSTEMS
[Recent Weight Gain (___ Lbs)] : recent [unfilled] ~Ulb weight gain [As Noted in HPI] : as noted in HPI [Fever] : no fever [Chills] : no chills [Feeling Poorly] : not feeling poorly [Feeling Tired] : not feeling tired [Recent Weight Loss (___ Lbs)] : no recent weight loss [Earache] : no earache [Loss Of Hearing] : no hearing loss [Nosebleeds] : no nosebleeds [Nasal Discharge] : no nasal discharge [Sore Throat] : no sore throat [Hoarseness] : no hoarseness [Heart Rate Is Slow] : the heart rate was not slow [Heart Rate Is Fast] : the heart rate was not fast [Chest Pain] : no chest pain [Palpitations] : no palpitations [Leg Claudication] : no intermittent leg claudication [Lower Ext Edema] : no lower extremity edema [Shortness Of Breath] : no shortness of breath [Wheezing] : no wheezing [Cough] : no cough [SOB on Exertion] : no shortness of breath during exertion [Orthopnea] : no orthopnea [PND] : no PND [Abdominal Pain] : no abdominal pain [Vomiting] : no vomiting [Constipation] : no constipation [Diarrhea] : no diarrhea [Heartburn] : no heartburn [Melena] : no melena [Dysuria] : no dysuria [Incontinence] : no incontinence [Pelvic Pain] : no pelvic pain [Dysmenorrhea] : no dysmenorrhea [Vaginal Discharge] : no vaginal discharge [Abn Vaginal Bleeding] : no unexplained vaginal bleeding [Skin Lesions] : no skin lesions [Skin Wound] : no skin wound [Itching] : no itching [Change In A Mole] : no change in a mole [Breast Pain] : no breast pain [Breast Lump] : no breast lump [Confused] : no confusion [Convulsions] : no convulsions [Dizziness] : no dizziness [Fainting] : no fainting [Limb Weakness] : no limb weakness [Difficulty Walking] : no difficulty walking [Suicidal] : not suicidal [Sleep Disturbances] : no sleep disturbances [Anxiety] : no anxiety [Depression] : no depression [Change In Personality] : no personality change [Emotional Problems] : no emotional problems [Easy Bleeding] : no tendency for easy bleeding [Easy Bruising] : no tendency for easy bruising [Swollen Glands] : no swollen glands [FreeTextEntry9] : cramps
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
## 2021-01-26 NOTE — ASSESSMENT
[FreeTextEntry1] : 71 y/o F w polyarthralgias, polymyalgias\par =pain in b/l wrists, ankles, knees\par =worse w activity \par =reported swelling, but no swelling on exam \par =multiple tender points \par =hx of AVN? b/l hip replacements\par =2 miscarriages \par \par No apparent swelling on exam. Given hx and exam, pt likely has degenerative arthritis such as OA vs FM. However, given hip replacement w possible AVN, and 2 miscarriages, pt should be r/o for autoimmune condition (APLS?). \par \par Plan\par Labs w serologies and inflammatory markers \par Xrays of hands/wrists, knees, ankles \par RTO depending on results

## 2021-02-01 DIAGNOSIS — M19.90 UNSPECIFIED OSTEOARTHRITIS, UNSPECIFIED SITE: ICD-10-CM

## 2021-02-02 RX ORDER — DICLOFENAC SODIUM 1% 10 MG/G
1 GEL TOPICAL
Qty: 1 | Refills: 0 | Status: ACTIVE | COMMUNITY
Start: 2021-02-01

## 2021-02-09 ENCOUNTER — NON-APPOINTMENT (OUTPATIENT)
Age: 71
End: 2021-02-09

## 2021-02-10 ENCOUNTER — NON-APPOINTMENT (OUTPATIENT)
Age: 71
End: 2021-02-10

## 2021-02-11 ENCOUNTER — NON-APPOINTMENT (OUTPATIENT)
Age: 71
End: 2021-02-11

## 2021-05-11 NOTE — H&P ADULT - PROBLEM/PLAN-2
COLON & RECTAL SURGERY  PROGRESS NOTE    May 11, 2021    SUBJECTIVE:  Feeling much better and in much better spirits today. Had a large brown bowel movement without any blood.     OBJECTIVE:  Temp:  [97.8  F (36.6  C)-98.6  F (37  C)] 98.2  F (36.8  C)  Pulse:  [62-93] 93  Resp:  [16-18] 18  BP: (153-178)/(79-98) 178/97  SpO2:  [92 %-95 %] 95 %    Intake/Output Summary (Last 24 hours) at 5/11/2021 1321  Last data filed at 5/11/2021 0659  Gross per 24 hour   Intake 2175 ml   Output --   Net 2175 ml       GENERAL:  Awake, alert, no acute distress  HEAD: Nomocephalic atraumatic  RESPIRATORY: unlabored breathing on room air   EXTREMITIES: warm and well perfused  ABDOMEN:  Soft, minimally tender, nondistended    LABS:  Lab Results   Component Value Date    WBC 13.9 05/11/2021     Lab Results   Component Value Date    HGB 12.4 05/11/2021     Lab Results   Component Value Date    HCT 37.7 05/11/2021     Lab Results   Component Value Date     05/11/2021     Last Basic Metabolic Panel:  Lab Results   Component Value Date     05/11/2021      Lab Results   Component Value Date    POTASSIUM 4.0 05/11/2021     Lab Results   Component Value Date    CHLORIDE 106 05/11/2021     Lab Results   Component Value Date    PATRICIA 8.3 05/11/2021     Lab Results   Component Value Date    CO2 28 05/11/2021     Lab Results   Component Value Date    BUN 16 05/11/2021     Lab Results   Component Value Date    CR 0.70 05/11/2021     Lab Results   Component Value Date     05/11/2021       ASSESSMENT/PLAN:  47yoF with left-sided UC vs. Med-induced colitis.    --Per patient and GI, plan is to continue PO steroids while awaiting peak activity of vedolizumab. Clinically she has improved on this medication regimen, so we will hold off on any surgical intervention at this point. Please call with questions or concerns.          DISPLAY PLAN FREE TEXT

## 2022-07-07 NOTE — ED ADULT TRIAGE NOTE - SOURCE OF INFORMATION
-- Pediatric NPO instructions as follows: (or as per your Surgeon)  --Stop ALL solid food, milk,gum, candy (including vitamins) 8 hours before surgery/procedure time.  --The patient should be ENCOURAGED to drink water and carbohydrate-rich clear liquids (sports drinks, clear juices,pedialyte) until 2 hours prior to surgery/procedure time.  --NOTHING TO EAT OR DRINK 2 hours before to surgery/procedure time.  --If you are told to take medication on the morning of surgery, it may be taken with a sip of water.   --Instructed to avoid vitamins,supplements,aspirin and ibuprophen until after procedure    -- Arrival place and directions given - [facility]  -- Bathing with antibacterial/regular soap   -- Don't wear any jewelry or bring any valuables AM of surgery   -- No makeup or moisturizer to face   -- No perfume/cologne/aftershave, powder, lotions, creams       Patient's mother denies any familial side effects or issues with anesthesia or sedation. This is the patient's first anesthesia     Patient's Mom:  Verbalized understanding.   Denied patient having fever over the past 2 weeks  Was given an arrival time of 0700 per surgeon's office  Will accompany patient to the hospital             
Patient

## 2024-04-02 NOTE — PATIENT PROFILE ADULT. - HAS THE PATIENT HAD A SIGNIFICANT CHANGE IN FUNCTIONAL STATUS DUE TO CVA, HEAD TRAUMA, ORTHOPEDIC TRAUMA/SURGERY, OR FALL, WITH THE WEEK PRIOR TO ADMISSION
"Subjective   Reason for Visit: Sherron Correa is an 85 y.o. female here for a Medicare Wellness visit.     Past Medical, Surgical, and Family History reviewed and updated in chart.    Reviewed all medications by prescribing practitioner or clinical pharmacist (such as prescriptions, OTCs, herbal therapies and supplements) and documented in the medical record.    [facility] patient presents to clinic for follow-up visit on history of recurrent UTI, osteoporosis, dyslipidemia, GERD, and chronic kidney disease.  She is also here for Medicare annual wellness exam.  She has been taking suppressive therapy regarding her history of recurrent UTI.  She is doing well otherwise and denies any nausea, vomiting, fever chills abdominal pain and palpitation.  Laboratory studies done shows WBC of 3.7 hemoglobin of 13.6, glucose of 91, cholesterol of 181 GFR of 60, free thyroxine level of 1.22 and other studies are unremarkable.  EKG done shows sinus rhythm at 72 bpm with normal axis normal interval without any ischemic changes.    Patient Care Team:  Autumn Weston MD as PCP - General  Autumn Weston MD as PCP - [employer] PCP  Autumn Weston MD as PCP - [employer] [facility] PCP     Review of Systems   Constitutional: Negative.    HENT: Negative.     Eyes: Negative.    Respiratory: Negative.     Cardiovascular: Negative.    Gastrointestinal: Negative.    Endocrine: Negative.    Genitourinary: Negative.    Musculoskeletal: Negative.    Skin: Negative.    Allergic/Immunologic: Negative.    Neurological: Negative.    Hematological: Negative.    Psychiatric/Behavioral: Negative.         Objective   Vitals:  /68   Pulse 68   Ht 1.6 m (5' 3\")   Wt 67.1 kg (148 lb)   SpO2 96%   BMI 26.22 kg/m²       Physical Exam  Constitutional:       Appearance: Normal appearance. She is normal weight.   HENT:      Right Ear: Tympanic membrane normal.      Left Ear: Tympanic membrane and ear canal normal.      Nose: Nose normal.   Neck:    "   Vascular: No carotid bruit.   Cardiovascular:      Rate and Rhythm: Normal rate.   Pulmonary:      Effort: No respiratory distress.      Breath sounds: No stridor. No wheezing.   Abdominal:      Palpations: Abdomen is soft.      Tenderness: There is no abdominal tenderness. There is no guarding or rebound.   Skin:     Coloration: Skin is not jaundiced.      Findings: No bruising.   Neurological:      General: No focal deficit present.      Mental Status: She is alert and oriented to person, place, and time.   Psychiatric:         Mood and Affect: Mood normal.         Assessment/Plan   Problem List Items Addressed This Visit       Age-related osteoporosis without current pathological fracture - Primary (Chronic)    Recurrent urinary tract infection    Chronic renal failure    Cystocele, midline     Other Visit Diagnoses       Urinary tract infection, site not specified        Dyslipidemia            Patient will be scheduled for CT cardiac scoring regarding screening for heart disease and mammogram for breast cancer screening.  She will continue current medication and will follow-up with Dr. Weston in 6 months.  She will continue suppressive therapy with cefdinir regarding her history of recurrent urinary tract infection.        no

## 2024-04-13 ENCOUNTER — EMERGENCY (EMERGENCY)
Facility: HOSPITAL | Age: 74
LOS: 1 days | Discharge: HOME | End: 2024-04-13
Attending: EMERGENCY MEDICINE
Payer: COMMERCIAL

## 2024-04-13 VITALS
OXYGEN SATURATION: 96 % | HEART RATE: 79 BPM | HEIGHT: 63 IN | SYSTOLIC BLOOD PRESSURE: 181 MMHG | DIASTOLIC BLOOD PRESSURE: 83 MMHG | RESPIRATION RATE: 18 BRPM | TEMPERATURE: 98 F | WEIGHT: 156.09 LBS

## 2024-04-13 VITALS
TEMPERATURE: 98 F | RESPIRATION RATE: 19 BRPM | SYSTOLIC BLOOD PRESSURE: 152 MMHG | HEART RATE: 69 BPM | OXYGEN SATURATION: 97 % | DIASTOLIC BLOOD PRESSURE: 76 MMHG

## 2024-04-13 DIAGNOSIS — Z98.890 OTHER SPECIFIED POSTPROCEDURAL STATES: Chronic | ICD-10-CM

## 2024-04-13 DIAGNOSIS — S05.90XA UNSPECIFIED INJURY OF UNSPECIFIED EYE AND ORBIT, INITIAL ENCOUNTER: Chronic | ICD-10-CM

## 2024-04-13 DIAGNOSIS — Z90.710 ACQUIRED ABSENCE OF BOTH CERVIX AND UTERUS: Chronic | ICD-10-CM

## 2024-04-13 LAB
ACETONE SERPL-MCNC: NEGATIVE — SIGNIFICANT CHANGE UP
ALBUMIN SERPL ELPH-MCNC: 3.5 G/DL — SIGNIFICANT CHANGE UP (ref 3.5–5)
ALP SERPL-CCNC: 95 U/L — SIGNIFICANT CHANGE UP (ref 40–120)
ALT FLD-CCNC: 19 U/L DA — SIGNIFICANT CHANGE UP (ref 10–60)
ANION GAP SERPL CALC-SCNC: 8 MMOL/L — SIGNIFICANT CHANGE UP (ref 5–17)
APPEARANCE UR: CLEAR — SIGNIFICANT CHANGE UP
AST SERPL-CCNC: 15 U/L — SIGNIFICANT CHANGE UP (ref 10–40)
BACTERIA # UR AUTO: ABNORMAL /HPF
BASOPHILS # BLD AUTO: 0.05 K/UL — SIGNIFICANT CHANGE UP (ref 0–0.2)
BASOPHILS NFR BLD AUTO: 0.5 % — SIGNIFICANT CHANGE UP (ref 0–2)
BILIRUB SERPL-MCNC: 0.4 MG/DL — SIGNIFICANT CHANGE UP (ref 0.2–1.2)
BILIRUB UR-MCNC: NEGATIVE — SIGNIFICANT CHANGE UP
BUN SERPL-MCNC: 23 MG/DL — HIGH (ref 7–18)
CALCIUM SERPL-MCNC: 9.1 MG/DL — SIGNIFICANT CHANGE UP (ref 8.4–10.5)
CHLORIDE SERPL-SCNC: 106 MMOL/L — SIGNIFICANT CHANGE UP (ref 96–108)
CK SERPL-CCNC: 97 U/L — SIGNIFICANT CHANGE UP (ref 21–215)
CO2 SERPL-SCNC: 24 MMOL/L — SIGNIFICANT CHANGE UP (ref 22–31)
COLOR SPEC: YELLOW — SIGNIFICANT CHANGE UP
CREAT SERPL-MCNC: 0.79 MG/DL — SIGNIFICANT CHANGE UP (ref 0.5–1.3)
DIFF PNL FLD: NEGATIVE — SIGNIFICANT CHANGE UP
EGFR: 79 ML/MIN/1.73M2 — SIGNIFICANT CHANGE UP
EOSINOPHIL # BLD AUTO: 0.02 K/UL — SIGNIFICANT CHANGE UP (ref 0–0.5)
EOSINOPHIL NFR BLD AUTO: 0.2 % — SIGNIFICANT CHANGE UP (ref 0–6)
EPI CELLS # UR: PRESENT
GLUCOSE SERPL-MCNC: 126 MG/DL — HIGH (ref 70–99)
GLUCOSE UR QL: NEGATIVE MG/DL — SIGNIFICANT CHANGE UP
HCT VFR BLD CALC: 39 % — SIGNIFICANT CHANGE UP (ref 34.5–45)
HGB BLD-MCNC: 12.9 G/DL — SIGNIFICANT CHANGE UP (ref 11.5–15.5)
IMM GRANULOCYTES NFR BLD AUTO: 1.4 % — HIGH (ref 0–0.9)
KETONES UR-MCNC: ABNORMAL MG/DL
LEUKOCYTE ESTERASE UR-ACNC: ABNORMAL
LIDOCAIN IGE QN: 29 U/L — SIGNIFICANT CHANGE UP (ref 13–75)
LYMPHOCYTES # BLD AUTO: 1.01 K/UL — SIGNIFICANT CHANGE UP (ref 1–3.3)
LYMPHOCYTES # BLD AUTO: 10.7 % — LOW (ref 13–44)
MAGNESIUM SERPL-MCNC: 2.3 MG/DL — SIGNIFICANT CHANGE UP (ref 1.6–2.6)
MCHC RBC-ENTMCNC: 29.1 PG — SIGNIFICANT CHANGE UP (ref 27–34)
MCHC RBC-ENTMCNC: 33.1 GM/DL — SIGNIFICANT CHANGE UP (ref 32–36)
MCV RBC AUTO: 87.8 FL — SIGNIFICANT CHANGE UP (ref 80–100)
MONOCYTES # BLD AUTO: 0.55 K/UL — SIGNIFICANT CHANGE UP (ref 0–0.9)
MONOCYTES NFR BLD AUTO: 5.8 % — SIGNIFICANT CHANGE UP (ref 2–14)
NEUTROPHILS # BLD AUTO: 7.69 K/UL — HIGH (ref 1.8–7.4)
NEUTROPHILS NFR BLD AUTO: 81.4 % — HIGH (ref 43–77)
NITRITE UR-MCNC: NEGATIVE — SIGNIFICANT CHANGE UP
NRBC # BLD: 0 /100 WBCS — SIGNIFICANT CHANGE UP (ref 0–0)
PH UR: 7 — SIGNIFICANT CHANGE UP (ref 5–8)
PLATELET # BLD AUTO: 453 K/UL — HIGH (ref 150–400)
POTASSIUM SERPL-MCNC: 3.9 MMOL/L — SIGNIFICANT CHANGE UP (ref 3.5–5.3)
POTASSIUM SERPL-SCNC: 3.9 MMOL/L — SIGNIFICANT CHANGE UP (ref 3.5–5.3)
PROT SERPL-MCNC: 7.8 G/DL — SIGNIFICANT CHANGE UP (ref 6–8.3)
PROT UR-MCNC: ABNORMAL MG/DL
RBC # BLD: 4.44 M/UL — SIGNIFICANT CHANGE UP (ref 3.8–5.2)
RBC # FLD: 14.1 % — SIGNIFICANT CHANGE UP (ref 10.3–14.5)
RBC CASTS # UR COMP ASSIST: 3 /HPF — SIGNIFICANT CHANGE UP (ref 0–4)
SODIUM SERPL-SCNC: 138 MMOL/L — SIGNIFICANT CHANGE UP (ref 135–145)
SP GR SPEC: 1.02 — SIGNIFICANT CHANGE UP (ref 1–1.03)
TROPONIN I, HIGH SENSITIVITY RESULT: 5.2 NG/L — SIGNIFICANT CHANGE UP
UROBILINOGEN FLD QL: 0.2 MG/DL — SIGNIFICANT CHANGE UP (ref 0.2–1)
WBC # BLD: 9.45 K/UL — SIGNIFICANT CHANGE UP (ref 3.8–10.5)
WBC # FLD AUTO: 9.45 K/UL — SIGNIFICANT CHANGE UP (ref 3.8–10.5)
WBC UR QL: 3 /HPF — SIGNIFICANT CHANGE UP (ref 0–5)

## 2024-04-13 PROCEDURE — 93010 ELECTROCARDIOGRAM REPORT: CPT

## 2024-04-13 PROCEDURE — 99285 EMERGENCY DEPT VISIT HI MDM: CPT

## 2024-04-13 RX ORDER — ONDANSETRON 8 MG/1
4 TABLET, FILM COATED ORAL ONCE
Refills: 0 | Status: COMPLETED | OUTPATIENT
Start: 2024-04-13 | End: 2024-04-13

## 2024-04-13 RX ORDER — SODIUM CHLORIDE 9 MG/ML
1000 INJECTION INTRAMUSCULAR; INTRAVENOUS; SUBCUTANEOUS ONCE
Refills: 0 | Status: COMPLETED | OUTPATIENT
Start: 2024-04-13 | End: 2024-04-13

## 2024-04-13 RX ORDER — SODIUM CHLORIDE 9 MG/ML
1000 INJECTION INTRAMUSCULAR; INTRAVENOUS; SUBCUTANEOUS
Refills: 0 | Status: DISCONTINUED | OUTPATIENT
Start: 2024-04-13 | End: 2024-04-17

## 2024-04-13 RX ORDER — FAMOTIDINE 10 MG/ML
20 INJECTION INTRAVENOUS ONCE
Refills: 0 | Status: COMPLETED | OUTPATIENT
Start: 2024-04-13 | End: 2024-04-13

## 2024-04-13 RX ADMIN — ONDANSETRON 4 MILLIGRAM(S): 8 TABLET, FILM COATED ORAL at 20:57

## 2024-04-13 RX ADMIN — SODIUM CHLORIDE 1000 MILLILITER(S): 9 INJECTION INTRAMUSCULAR; INTRAVENOUS; SUBCUTANEOUS at 20:56

## 2024-04-13 RX ADMIN — SODIUM CHLORIDE 125 MILLILITER(S): 9 INJECTION INTRAMUSCULAR; INTRAVENOUS; SUBCUTANEOUS at 20:56

## 2024-04-13 RX ADMIN — FAMOTIDINE 20 MILLIGRAM(S): 10 INJECTION INTRAVENOUS at 20:57

## 2024-04-13 NOTE — ED ADULT TRIAGE NOTE - CHIEF COMPLAINT QUOTE
walked  in with  c/o  high blood pressure  and  dizziness also with   strong smell in her  urine. pt  states  she also  has  vomiting and nausea x  10 days.

## 2024-04-13 NOTE — ED ADULT NURSE NOTE - OBJECTIVE STATEMENT
Patient c/o intermittent dizziness, nausea, chills and col-like symptoms x5 days. Pt denies any chest pain, sob, blurry vision or diarrhea.

## 2024-04-14 PROCEDURE — 82009 KETONE BODYS QUAL: CPT

## 2024-04-14 PROCEDURE — 81001 URINALYSIS AUTO W/SCOPE: CPT

## 2024-04-14 PROCEDURE — 96375 TX/PRO/DX INJ NEW DRUG ADDON: CPT

## 2024-04-14 PROCEDURE — 85025 COMPLETE CBC W/AUTO DIFF WBC: CPT

## 2024-04-14 PROCEDURE — 70450 CT HEAD/BRAIN W/O DYE: CPT | Mod: 26,MC

## 2024-04-14 PROCEDURE — 36415 COLL VENOUS BLD VENIPUNCTURE: CPT

## 2024-04-14 PROCEDURE — 82550 ASSAY OF CK (CPK): CPT

## 2024-04-14 PROCEDURE — 71045 X-RAY EXAM CHEST 1 VIEW: CPT | Mod: 26

## 2024-04-14 PROCEDURE — 99285 EMERGENCY DEPT VISIT HI MDM: CPT | Mod: 25

## 2024-04-14 PROCEDURE — 83735 ASSAY OF MAGNESIUM: CPT

## 2024-04-14 PROCEDURE — 96374 THER/PROPH/DIAG INJ IV PUSH: CPT

## 2024-04-14 PROCEDURE — 80053 COMPREHEN METABOLIC PANEL: CPT

## 2024-04-14 PROCEDURE — 93005 ELECTROCARDIOGRAM TRACING: CPT

## 2024-04-14 PROCEDURE — 70450 CT HEAD/BRAIN W/O DYE: CPT | Mod: MC

## 2024-04-14 PROCEDURE — 84484 ASSAY OF TROPONIN QUANT: CPT

## 2024-04-14 PROCEDURE — 71045 X-RAY EXAM CHEST 1 VIEW: CPT

## 2024-04-14 PROCEDURE — 83690 ASSAY OF LIPASE: CPT

## 2024-04-14 RX ORDER — ACETAMINOPHEN 500 MG
650 TABLET ORAL ONCE
Refills: 0 | Status: COMPLETED | OUTPATIENT
Start: 2024-04-14 | End: 2024-04-14

## 2024-04-14 RX ORDER — AMLODIPINE BESYLATE 2.5 MG/1
5 TABLET ORAL ONCE
Refills: 0 | Status: COMPLETED | OUTPATIENT
Start: 2024-04-14 | End: 2024-04-14

## 2024-04-14 RX ORDER — MECLIZINE HCL 12.5 MG
1 TABLET ORAL
Qty: 20 | Refills: 0
Start: 2024-04-14 | End: 2024-04-18

## 2024-04-14 RX ORDER — ONDANSETRON 8 MG/1
1 TABLET, FILM COATED ORAL
Qty: 20 | Refills: 0
Start: 2024-04-14 | End: 2024-04-18

## 2024-04-14 RX ADMIN — AMLODIPINE BESYLATE 5 MILLIGRAM(S): 2.5 TABLET ORAL at 01:23

## 2024-04-14 RX ADMIN — Medication 650 MILLIGRAM(S): at 01:53

## 2024-04-14 RX ADMIN — Medication 650 MILLIGRAM(S): at 01:23
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
## 2024-04-14 NOTE — ED PROVIDER NOTE - CLINICAL SUMMARY MEDICAL DECISION MAKING FREE TEXT BOX
74-year-old female history of HTN, GERD complaining of flulike symptoms for past 2 weeks, now with dizziness, vertigo today patient mostly has acute labyrinthitis due to her recent cold.  Given her age, will get CT head to rule out cerebellar stroke.  Patient also noted with elevated blood pressure mostly due to stress and not taking her meds.  Will get labs, troponin, give meds and IV fluids and reassess

## 2024-04-14 NOTE — ED PROVIDER NOTE - PATIENT PORTAL LINK FT
You can access the FollowMyHealth Patient Portal offered by [facility] by registering at the following website: [email or web address]. By joining [employer]’s FollowMyHealth portal, you will also be able to view your health information using other applications (apps) compatible with our system.

## 2024-04-14 NOTE — ED PROVIDER NOTE - CONSTITUTIONAL MANNER
appropriate for situation Dutasteride Pregnancy And Lactation Text: This medication is absolutely contraindicated in women, especially during pregnancy and breast feeding. Feminization of male fetuses is possible if taking while pregnant.

## 2024-04-14 NOTE — ED PROVIDER NOTE - NSICDXPASTSURGICALHX_GEN_ALL_CORE_FT
PAST SURGICAL HISTORY:  H/O bilateral hip replacements     H/O cardiac catheterization     H/O knee surgery     S/P ASHLEY-BSO     Trauma to eye

## 2024-04-14 NOTE — ED PROVIDER NOTE - NSICDXFAMILYHX_GEN_ALL_CORE_FT
FAMILY HISTORY:  Father  Still living? Unknown  Family history of leukemia, Age at diagnosis: Age Unknown    Mother  Still living? No  Family history of MI (myocardial infarction), Age at diagnosis: Age Unknown

## 2024-04-14 NOTE — ED PROVIDER NOTE - NSFOLLOWUPINSTRUCTIONS_ED_ALL_ED_FT
Labyrinthitis  Ear anatomy showing labyrinth structures.  Labyrinthitis is an inner ear infection. The inner ear is a system of tubes and canals (labyrinth) that are filled with fluid. The inner ear also contains nerve cells that send hearing and balance signals to the brain. When tiny germs (microorganisms) get inside the labyrinth, they harm the cells that send messages to the brain. This can cause changes in hearing and balance.    Labyrinthitis usually develops suddenly and goes away with treatment in a few weeks (acute labyrinthitis). If the infection damages parts of the labyrinth, some symptoms may last for a long time (chronic labyrinthitis).    What are the causes?  Labyrinthitis can be caused by viruses, such as one that causes:  Infectious mononucleosis, also called mono.  Measles or mumps.  The flu (influenza).  Herpes.  Labyrinthitis can also be caused by bacteria that spread from an infection in the brain or the middle ear (suppurative labyrinthitis). In some cases, the bacteria may produce a poison (toxin) that gets inside the labyrinth (serous labyrinthitis).    What increases the risk?  You may be at greater risk for labyrinthitis if you:  Recently had a mouth, nose, or throat infection (upper respiratory infection) or an ear infection.  Drink a lot of alcohol.  Smoke.  Use certain drugs.  Are feeling tired (fatigued).  Are experiencing a lot of stress.  Have allergies.  What are the signs or symptoms?  Symptoms of labyrinthitis usually start suddenly. The symptoms may range from mild to severe, and may include:  Dizziness.  Hearing loss.  A feeling that you or your surroundings are moving when they are not (vertigo).  Ringing in your ear (tinnitus).  Nausea and vomiting.  Trouble focusing your eyes.  Symptoms of chronic labyrinthitis may include:  Fatigue.  Confusion.  Hearing loss.  Tinnitus.  Poor balance.  Vertigo after sudden head movements.  How is this diagnosed?  This condition may be diagnosed based on:  Your symptoms and medical history. Your health care provider may ask about any dizziness or hearing loss you have and any recent upper respiratory infections.  A physical exam that involves:  Checking your ears for infection.  Testing your balance.  Checking your eye movement.  Hearing tests.  Imaging tests, such as a CT scan or an MRI.  Tests of your eye movements (electronystagmogram, or ENG).  How is this treated?  Treatment depends on the cause. If your condition is caused by bacteria, you may need antibiotic medicine. If it is caused by a virus, it may get better on its own.    Regardless of the cause, you may be treated with:  Medicines to:  Stop dizziness.  Relieve nausea.  Reduce inflammation.  Speed your recovery.  IV fluids. These may be given at a hospital. You may need IV fluids if you have severe nausea and vomiting.  Physical therapy. A therapist can teach you exercises to help you adjust to feeling dizzy (vestibular rehabilitation exercises). You may need this if you have dizziness that does not go away.  Follow these instructions at home:  Medicines    Take over-the-counter and prescription medicines only as told by your health care provider.  If you were prescribed an antibiotic medicine, take it as told by your health care provider. Do not stop taking the antibiotic even if you start to feel better.  Activity    Rest as told by your health care provider.  Limit your activity as directed. Ask your health care provider what activities are safe for you.  Do not make sudden movements until any dizziness goes away.  If physical therapy was prescribed, do exercises as directed.  General instructions    Avoid loud noises and bright lights.  Do not drive until your health care provider says that this is safe for you.  Drink enough fluid to keep your urine pale yellow.  Keep all follow-up visits. This is important.  Contact a health care provider if you have:  Symptoms that do not get better with medicine.  Symptoms that last longer than 2 weeks.  A fever.  Get help right away if you have:  Nausea or vomiting that is severe or does not go away.  Severe dizziness.  Sudden hearing loss.  Summary  Labyrinthitis is an infection of the inner ear. It can cause changes in hearing and balance or vertigo.  Symptoms usually start suddenly and include dizziness, hearing loss, nausea, and vomiting. You may also have ringing in your ear (tinnitus), trouble focusing your eyes, and vertigo.  If the condition lasts more than a few weeks, symptoms may include fatigue, confusion, hearing loss, poor balance, tinnitus, and vertigo.  Treatment depends on the cause. If your labyrinthitis is caused by bacteria, you may need antibiotic medicine. If your labyrinthitis is caused by a virus, it may get better on its own.  Follow your health care provider's instructions, including how to take medicines, what activities to avoid, and when to get medical help.  This information is not intended to replace advice given to you by your health care provider. Make sure you discuss any questions you have with your health care provider.      You must follow-up with your medical doctor   take Zofran under your tongue 3-4 times a day as needed for nauseousness   then take meclizine 3-4 times a day as needed for your dizziness   if no improvement, you need to see a neurologist Laberintitis  Labyrinthitis  Ear anatomy showing labyrinth structures.  La laberintitis es odette infección en el oído interno. El oído interno es un sistema de conductos y cavidades (laberinto) que está lleno de líquido. En el oído interno hay células nerviosas que envían señales para la audición y el equilibrio al cerebro. Cuando en los conductos y rosario se introducen microbios diminutos (microorganismos), estos dañan las neuronas que envían mensajes al cerebro. [place] puede provocar cambios en la audición y el equilibrio.    La laberintitis generalmente comienza de manera súbita y desaparece con tratamiento en pocas semanas (laberintitis aguda). Si la infección daña partes del laberinto, algunos síntomas pueden permanecer por mucho tiempo (laberintitis crónica).    ¿Cuáles son las causas?  La causa de la laberintitis pueden ser virus, jaelyn el que causa:  Mononucleosis infecciosa, también llamada "mono".  Sarampión o paperas.  Gripe (influenza).  Herpes.  Bacterias que se propagan desde odette infección en el cerebro o el oído medio también pueden causar laberintitis (laberintitis purulenta). En algunos casos, las bacterias pueden producir un veneno (toxina) que penetra en el laberinto (laberintitis serosa).    ¿Qué incrementa el riesgo?  Puede estar expuesto a un riesgo mayor de tener laberintitis si:  Recientemente tuvo odette infección en la boca, la nariz o la garganta (infección de las vías respiratorias superiores) o odette infección en el oído.  Brittany mucho alcohol.  Fuma.  Reanna determinados medicamentos.  Se siente cansado (fatigado).  Está experimentando mucho estrés.  Tiene alergias.  ¿Cuáles son los signos o síntomas?  Generalmente, los síntomas de laberintitis comienzan de manera repentina. Los síntomas pueden variar de leves a graves, y pueden incluir:  Mareos.  Pérdida auditiva.  Odette sensación de que usted o todo lo que lo rodea se mueve cuando en realidad eso no sucede (vértigo).  Zumbidos en los oídos (tinnitus).  Náuseas y vómitos.  Dificultad para enfocar los ojos.  Los síntomas de laberintitis crónica pueden incluir lo siguiente:  Fatiga.  Confusión.  Pérdida auditiva.  Acúfenos (tinnitus).  Falta de equilibrio.  Vértigo después de realizar movimientos bruscos con la nikole.  ¿Cómo se diagnostica?  Esta afección se puede diagnosticar en función de lo siguiente:  Los síntomas y los antecedentes médicos. El médico puede hacerle preguntas sobre cualquier mareo o pérdida de la audición que tenga y sobre las infecciones de las vías respiratorias superiores que haya tenido recientemente.  Un examen físico que incluye:  Un examen de los oídos para detectar odette infección.  Odette prueba de equilibrio.  Controlar seth movimientos oculares.  Pruebas de audición.  Pruebas de diagnóstico por imágenes, jaelyn odette exploración por tomografía computarizada (TC) o odette resonancia magnética (RM).  Estudios de los movimientos oculares (electronistagmografía, o ENG).  ¿Cómo se trata?  El tratamiento depende de la causa. Pueden administrarle antibióticos, si la causa de farrell enfermedad es odette bacteria. Si la causa de farrell enfermedad es un virus, puede mejorar por sí jerald.    Cualquiera sea la causa, el tratamiento puede incluir:  Medicamentos para:  Detener los mareos.  Aliviar las náuseas.  Reducir la inflamación.  Acelerar la recuperación.  Líquidos intravenosos (i.v.) Estos pueden administrarse en un hospital. Es posible que necesite líquidos por vía intravenosa si tiene náuseas y vómitos graves.  Fisioterapia. El terapeuta puede enseñarle ejercicios para ayudarlo a que se adapte a la sensación de mareo (ejercicios de rehabilitación vestibular). Puede necesitar esto si seth mareos no desaparecen.  Siga estas instrucciones en farrell casa:  Medicamentos    Use los medicamentos de venta ellie y los recetados solamente jaelyn se lo haya indicado el médico.  Si le recetaron un antibiótico, tómelo jaelyn se lo haya indicado el médico. No deje de todd el antibiótico aunque comience a sentirse mejor.  Actividad    Alyx reposo jaelyn se lo haya indicado el médico.  Limite farrell actividad según las indicaciones. Pregúntele al médico qué actividades son seguras para usted.  No alyx movimientos repentinos hasta no tener más mareos.  Si le indicaron fisioterapia, alyx los ejercicios jaelyn se lo hayan indicado.  Indicaciones generales    Evite los ruidos barb y las luces brillantes.  No conduzca hasta que farrell médico le diga que es seguro hacerlo.  Eileen suficiente líquido jaelyn para mantener la orina de color amarillo pálido.  Concurra a todas las visitas de seguimiento. [place] es importante.  Comuníquese con un médico si tiene:  Síntomas que no mejoran con los medicamentos.  Síntomas que se prolongan sae más de 2 semanas.  Fiebre.  Solicite ayuda de inmediato si tiene:  Náuseas o vómitos que son graves o no desaparecen.  Mareos intensos.  Odette pérdida de la audición repentina.  Resumen  La laberintitis es odette infección del oído interno. Puede provocar cambios en la audición y el equilibrio, o vértigo.  Los síntomas generalmente comienzan de manera repentina e incluyen mareos, pérdida de la audición, náuseas y vómitos. También puede tener zumbidos en el oído (acúfenos), problemas para enfocar los ojos y vértigo.  Si la afección dura más que un par de semanas, los síntomas pueden incluir fatiga, confusión, pérdida de la audición, problemas de equilibrio, tinnitus y vértigo.  El tratamiento depende de la causa. Pueden administrarle antibióticos, si la causa de la laberintitis es odette bacteria. Si la causa de la laberintitis es un virus, puede mejorar por sí jerald.  Siga las instrucciones del médico acerca de cómo todd los medicamentos, qué actividades debe evitar y cuándo solicitar ayuda médica.  Esta información no tiene jaelyn fin reemplazar el consejo del médico. Asegúrese de hacerle al médico cualquier pregunta que tenga.      You must follow-up with your medical doctor   take Zofran under your tongue 3-4 times a day as needed for nauseousness   then take meclizine 3-4 times a day as needed for your dizziness   if no improvement, you need to see a neurologist

## 2024-04-14 NOTE — ED PROVIDER NOTE - NSICDXPASTMEDICALHX_GEN_ALL_CORE_FT
PAST MEDICAL HISTORY:  CAD (coronary artery disease)     GERD (gastroesophageal reflux disease)     History of percutaneous coronary intervention     HLD (hyperlipidemia)

## 2024-04-14 NOTE — ED PROVIDER NOTE - PROGRESS NOTE DETAILS
labs/EKG explained to patient   patient is feeling better, except for headache, will give Tylenol, CT head pending CT results explained to patient's son   patient is feeling much better, ambulating with no difficulty, will D/C home.  Advised to follow-up with PCP

## 2025-01-29 ENCOUNTER — APPOINTMENT (OUTPATIENT)
Dept: ENDOCRINOLOGY | Facility: CLINIC | Age: 75
End: 2025-01-29
Payer: MEDICARE

## 2025-01-29 VITALS
BODY MASS INDEX: 27.46 KG/M2 | WEIGHT: 160 LBS | SYSTOLIC BLOOD PRESSURE: 147 MMHG | HEART RATE: 77 BPM | DIASTOLIC BLOOD PRESSURE: 86 MMHG

## 2025-01-29 DIAGNOSIS — Z86.39 PERSONAL HISTORY OF OTHER ENDOCRINE, NUTRITIONAL AND METABOLIC DISEASE: ICD-10-CM

## 2025-01-29 PROCEDURE — 99204 OFFICE O/P NEW MOD 45 MIN: CPT

## 2025-03-21 NOTE — PATIENT PROFILE ADULT. - CAREGIVER PHONE NUMBER
Photo Preface (Leave Blank If You Do Not Want): Photographs were obtained today
Detail Level: Zone
[id number]